# Patient Record
Sex: MALE | Race: WHITE | NOT HISPANIC OR LATINO | Employment: OTHER | URBAN - METROPOLITAN AREA
[De-identification: names, ages, dates, MRNs, and addresses within clinical notes are randomized per-mention and may not be internally consistent; named-entity substitution may affect disease eponyms.]

---

## 2017-08-04 LAB — INR PPP: 3.3

## 2017-08-07 ENCOUNTER — ANTI-COAG VISIT (OUTPATIENT)
Dept: CARDIOLOGY | Facility: CLINIC | Age: 67
End: 2017-08-07

## 2017-08-07 DIAGNOSIS — I34.9 NONRHEUMATIC MITRAL VALVE DISORDER: ICD-10-CM

## 2017-08-07 DIAGNOSIS — Z95.2 H/O MITRAL VALVE REPLACEMENT WITH MECHANICAL VALVE: ICD-10-CM

## 2017-08-07 DIAGNOSIS — Z79.01 ANTICOAGULATED ON COUMADIN: ICD-10-CM

## 2017-08-14 LAB — INR PPP: 2.7

## 2017-08-15 ENCOUNTER — ANTI-COAG VISIT (OUTPATIENT)
Dept: CARDIOLOGY | Facility: CLINIC | Age: 67
End: 2017-08-15

## 2017-08-15 DIAGNOSIS — Z95.2 H/O MITRAL VALVE REPLACEMENT WITH MECHANICAL VALVE: ICD-10-CM

## 2017-08-15 DIAGNOSIS — I34.9 NONRHEUMATIC MITRAL VALVE DISORDER: ICD-10-CM

## 2017-08-15 DIAGNOSIS — Z79.01 ANTICOAGULATED ON COUMADIN: ICD-10-CM

## 2017-09-26 ENCOUNTER — ANTI-COAG VISIT (OUTPATIENT)
Dept: CARDIOLOGY | Facility: CLINIC | Age: 67
End: 2017-09-26

## 2017-09-26 DIAGNOSIS — Z95.2 H/O MITRAL VALVE REPLACEMENT WITH MECHANICAL VALVE: ICD-10-CM

## 2017-09-26 DIAGNOSIS — Z79.01 ANTICOAGULATED ON COUMADIN: ICD-10-CM

## 2017-09-26 DIAGNOSIS — I34.9 NONRHEUMATIC MITRAL VALVE DISORDER: ICD-10-CM

## 2017-09-26 LAB — INR PPP: 3 (ref 2.5–3)

## 2017-09-26 NOTE — PROGRESS NOTES
Instructed on dosing instructions, recheck date, & we will call on if OOR.  Patient verbalizes understanding.

## 2017-10-09 LAB — INR PPP: 2 (ref 2.5–3)

## 2017-10-10 ENCOUNTER — ANTI-COAG VISIT (OUTPATIENT)
Dept: CARDIOLOGY | Facility: CLINIC | Age: 67
End: 2017-10-10

## 2017-10-10 DIAGNOSIS — Z79.01 ANTICOAGULATED ON COUMADIN: ICD-10-CM

## 2017-10-10 DIAGNOSIS — Z95.2 H/O MITRAL VALVE REPLACEMENT WITH MECHANICAL VALVE: ICD-10-CM

## 2017-10-10 DIAGNOSIS — I34.9 NONRHEUMATIC MITRAL VALVE DISORDER: ICD-10-CM

## 2017-10-16 LAB — INR PPP: 3 (ref 2.5–3)

## 2017-10-17 ENCOUNTER — ANTI-COAG VISIT (OUTPATIENT)
Dept: CARDIOLOGY | Facility: CLINIC | Age: 67
End: 2017-10-17

## 2017-10-17 DIAGNOSIS — I34.9 NONRHEUMATIC MITRAL VALVE DISORDER: ICD-10-CM

## 2017-10-17 DIAGNOSIS — Z79.01 ANTICOAGULATED ON COUMADIN: ICD-10-CM

## 2017-10-17 DIAGNOSIS — Z95.2 H/O MITRAL VALVE REPLACEMENT WITH MECHANICAL VALVE: ICD-10-CM

## 2017-10-31 ENCOUNTER — ANTI-COAG VISIT (OUTPATIENT)
Dept: CARDIOLOGY | Facility: CLINIC | Age: 67
End: 2017-10-31

## 2017-10-31 DIAGNOSIS — Z79.01 ANTICOAGULATED ON COUMADIN: ICD-10-CM

## 2017-10-31 DIAGNOSIS — I34.9 NONRHEUMATIC MITRAL VALVE DISORDER: ICD-10-CM

## 2017-10-31 DIAGNOSIS — Z95.2 H/O MITRAL VALVE REPLACEMENT WITH MECHANICAL VALVE: ICD-10-CM

## 2017-10-31 LAB — INR PPP: 2.9 (ref 2.5–3)

## 2017-11-28 ENCOUNTER — ANTI-COAG VISIT (OUTPATIENT)
Dept: CARDIOLOGY | Facility: CLINIC | Age: 67
End: 2017-11-28

## 2017-11-28 DIAGNOSIS — I34.9 NONRHEUMATIC MITRAL VALVE DISORDER: ICD-10-CM

## 2017-11-28 DIAGNOSIS — Z79.01 ANTICOAGULATED ON COUMADIN: ICD-10-CM

## 2017-11-28 DIAGNOSIS — Z95.2 H/O MITRAL VALVE REPLACEMENT WITH MECHANICAL VALVE: ICD-10-CM

## 2017-11-28 LAB — INR PPP: 3.2 (ref 2.5–3)

## 2018-01-09 ENCOUNTER — ANTI-COAG VISIT (OUTPATIENT)
Dept: CARDIOLOGY | Facility: CLINIC | Age: 68
End: 2018-01-09

## 2018-01-09 DIAGNOSIS — I34.9 NONRHEUMATIC MITRAL VALVE DISORDER: ICD-10-CM

## 2018-01-09 DIAGNOSIS — Z79.01 ANTICOAGULATED ON COUMADIN: ICD-10-CM

## 2018-01-09 DIAGNOSIS — Z95.2 H/O MITRAL VALVE REPLACEMENT WITH MECHANICAL VALVE: ICD-10-CM

## 2018-01-09 LAB — INR PPP: 2.7 (ref 2.5–3)

## 2018-01-30 ENCOUNTER — PATIENT MESSAGE (OUTPATIENT)
Dept: CARDIOLOGY | Facility: CLINIC | Age: 68
End: 2018-01-30

## 2018-02-14 ENCOUNTER — ANTI-COAG VISIT (OUTPATIENT)
Dept: CARDIOLOGY | Facility: CLINIC | Age: 68
End: 2018-02-14

## 2018-02-14 DIAGNOSIS — I34.9 NONRHEUMATIC MITRAL VALVE DISORDER: ICD-10-CM

## 2018-02-14 DIAGNOSIS — Z79.01 ANTICOAGULATED ON COUMADIN: ICD-10-CM

## 2018-02-14 DIAGNOSIS — Z95.2 H/O MITRAL VALVE REPLACEMENT WITH MECHANICAL VALVE: ICD-10-CM

## 2018-02-14 LAB — INR PPP: 2.1 (ref 2.5–3)

## 2018-02-28 ENCOUNTER — ANTI-COAG VISIT (OUTPATIENT)
Dept: CARDIOLOGY | Facility: CLINIC | Age: 68
End: 2018-02-28

## 2018-02-28 DIAGNOSIS — Z79.01 ANTICOAGULATED ON COUMADIN: ICD-10-CM

## 2018-02-28 DIAGNOSIS — Z95.2 H/O MITRAL VALVE REPLACEMENT WITH MECHANICAL VALVE: ICD-10-CM

## 2018-02-28 DIAGNOSIS — I34.9 NONRHEUMATIC MITRAL VALVE DISORDER: ICD-10-CM

## 2018-02-28 LAB — INR PPP: 2.8 (ref 2.5–3)

## 2018-03-28 ENCOUNTER — ANTI-COAG VISIT (OUTPATIENT)
Dept: CARDIOLOGY | Facility: CLINIC | Age: 68
End: 2018-03-28

## 2018-03-28 DIAGNOSIS — Z95.2 H/O MITRAL VALVE REPLACEMENT WITH MECHANICAL VALVE: ICD-10-CM

## 2018-03-28 DIAGNOSIS — Z79.01 ANTICOAGULATED ON COUMADIN: ICD-10-CM

## 2018-03-28 DIAGNOSIS — I34.9 NONRHEUMATIC MITRAL VALVE DISORDER: ICD-10-CM

## 2018-03-28 LAB — INR PPP: 3.4 (ref 2.5–3)

## 2018-04-25 ENCOUNTER — ANTI-COAG VISIT (OUTPATIENT)
Dept: CARDIOLOGY | Facility: CLINIC | Age: 68
End: 2018-04-25

## 2018-04-25 DIAGNOSIS — I34.9 NONRHEUMATIC MITRAL VALVE DISORDER: ICD-10-CM

## 2018-04-25 DIAGNOSIS — Z79.01 ANTICOAGULATED ON COUMADIN: ICD-10-CM

## 2018-04-25 DIAGNOSIS — Z95.2 H/O MITRAL VALVE REPLACEMENT WITH MECHANICAL VALVE: ICD-10-CM

## 2018-04-25 LAB — INR PPP: 3.3 (ref 2.5–3)

## 2018-05-09 ENCOUNTER — ANTI-COAG VISIT (OUTPATIENT)
Dept: CARDIOLOGY | Facility: CLINIC | Age: 68
End: 2018-05-09

## 2018-05-09 DIAGNOSIS — Z79.01 ANTICOAGULATED ON COUMADIN: ICD-10-CM

## 2018-05-09 DIAGNOSIS — Z95.2 H/O MITRAL VALVE REPLACEMENT WITH MECHANICAL VALVE: ICD-10-CM

## 2018-05-09 DIAGNOSIS — I34.9 NONRHEUMATIC MITRAL VALVE DISORDER: ICD-10-CM

## 2018-05-09 LAB — INR PPP: 2.6 (ref 2.5–3)

## 2018-05-23 ENCOUNTER — ANTI-COAG VISIT (OUTPATIENT)
Dept: CARDIOLOGY | Facility: CLINIC | Age: 68
End: 2018-05-23

## 2018-05-23 DIAGNOSIS — Z79.01 ANTICOAGULATED ON COUMADIN: ICD-10-CM

## 2018-05-23 DIAGNOSIS — Z95.2 H/O MITRAL VALVE REPLACEMENT WITH MECHANICAL VALVE: ICD-10-CM

## 2018-05-23 DIAGNOSIS — I34.9 NONRHEUMATIC MITRAL VALVE DISORDER: ICD-10-CM

## 2018-05-23 LAB — INR PPP: 3.3 (ref 2–3)

## 2018-05-23 NOTE — PROGRESS NOTES
Spoke to pt. INR slightly elevated for pt today.  Patient confirms dose and compliance. Stated he has been out of town & diet has been decreased in Vitamin K lately.  No other changes or problems reported.  Decrease dose 5/23/18 & then resume.  Recheck INR in next week. Pt able to restate instructions.

## 2018-05-30 ENCOUNTER — ANTI-COAG VISIT (OUTPATIENT)
Dept: CARDIOLOGY | Facility: CLINIC | Age: 68
End: 2018-05-30

## 2018-05-30 DIAGNOSIS — I34.9 NONRHEUMATIC MITRAL VALVE DISORDER: ICD-10-CM

## 2018-05-30 DIAGNOSIS — Z95.2 H/O MITRAL VALVE REPLACEMENT WITH MECHANICAL VALVE: ICD-10-CM

## 2018-05-30 DIAGNOSIS — Z79.01 ANTICOAGULATED ON COUMADIN: ICD-10-CM

## 2018-05-30 LAB — INR PPP: 2.9 (ref 2.5–3)

## 2018-05-30 NOTE — PROGRESS NOTES
INR received via coag per pt.   INR in range.  Will maintain dose and recheck INR in 2 weeks. Pt asked to be called only if out of range.

## 2018-06-19 NOTE — PROGRESS NOTES
Spoke with pt to remind him to test he reports he tested last week and will call back with results and what day he tested when he gets home

## 2018-08-22 ENCOUNTER — ANTI-COAG VISIT (OUTPATIENT)
Dept: CARDIOLOGY | Facility: CLINIC | Age: 68
End: 2018-08-22

## 2018-08-22 DIAGNOSIS — Z79.01 ANTICOAGULATED ON COUMADIN: ICD-10-CM

## 2018-08-22 DIAGNOSIS — I34.9 NONRHEUMATIC MITRAL VALVE DISORDER: ICD-10-CM

## 2018-08-22 DIAGNOSIS — Z95.2 H/O MITRAL VALVE REPLACEMENT WITH MECHANICAL VALVE: ICD-10-CM

## 2018-08-22 LAB — INR PPP: 2.4 (ref 2.5–3)

## 2019-09-24 ENCOUNTER — PATIENT MESSAGE (OUTPATIENT)
Dept: CARDIOLOGY | Facility: CLINIC | Age: 69
End: 2019-09-24

## 2019-11-14 PROBLEM — I25.5 ISCHEMIC CARDIOMYOPATHY: Status: ACTIVE | Noted: 2019-11-14

## 2019-11-14 PROBLEM — Z95.5 S/P CORONARY ARTERY STENT PLACEMENT: Status: ACTIVE | Noted: 2019-11-14

## 2019-11-25 PROBLEM — M75.122 NONTRAUMATIC COMPLETE TEAR OF LEFT ROTATOR CUFF: Status: ACTIVE | Noted: 2019-11-25

## 2019-11-25 PROBLEM — M75.121 COMPLETE TEAR OF RIGHT ROTATOR CUFF: Status: ACTIVE | Noted: 2019-11-25

## 2019-12-03 PROBLEM — R93.1 DECREASED CARDIAC EJECTION FRACTION: Status: ACTIVE | Noted: 2019-12-03

## 2020-01-30 PROBLEM — Z95.0 BIVENTRICULAR CARDIAC PACEMAKER IN SITU: Status: ACTIVE | Noted: 2020-01-30

## 2020-10-30 PROBLEM — S68.119A AMPUTATION OF FINGER TIP: Status: ACTIVE | Noted: 2020-10-30

## 2021-01-05 ENCOUNTER — TELEPHONE (OUTPATIENT)
Dept: FAMILY MEDICINE | Facility: CLINIC | Age: 71
End: 2021-01-05

## 2021-01-05 ENCOUNTER — IMMUNIZATION (OUTPATIENT)
Dept: FAMILY MEDICINE | Facility: CLINIC | Age: 71
End: 2021-01-05
Payer: MEDICARE

## 2021-01-05 DIAGNOSIS — Z23 NEED FOR VACCINATION: ICD-10-CM

## 2021-01-05 PROCEDURE — 91300 COVID-19, MRNA, LNP-S, PF, 30 MCG/0.3 ML DOSE VACCINE: CPT | Mod: PBBFAC | Performed by: FAMILY MEDICINE

## 2021-01-26 ENCOUNTER — IMMUNIZATION (OUTPATIENT)
Dept: FAMILY MEDICINE | Facility: CLINIC | Age: 71
End: 2021-01-26
Payer: MEDICARE

## 2021-01-26 DIAGNOSIS — Z23 NEED FOR VACCINATION: Primary | ICD-10-CM

## 2021-01-26 PROCEDURE — 0002A COVID-19, MRNA, LNP-S, PF, 30 MCG/0.3 ML DOSE VACCINE: CPT | Mod: PBBFAC | Performed by: FAMILY MEDICINE

## 2021-01-26 PROCEDURE — 91300 COVID-19, MRNA, LNP-S, PF, 30 MCG/0.3 ML DOSE VACCINE: CPT | Mod: PBBFAC | Performed by: FAMILY MEDICINE

## 2025-05-02 ENCOUNTER — HOSPITAL ENCOUNTER (INPATIENT)
Facility: HOSPITAL | Age: 75
LOS: 1 days | Discharge: HOME | DRG: 316 | End: 2025-05-05
Attending: EMERGENCY MEDICINE | Admitting: STUDENT IN AN ORGANIZED HEALTH CARE EDUCATION/TRAINING PROGRAM
Payer: MEDICARE

## 2025-05-02 DIAGNOSIS — I72.4 PSEUDOANEURYSM OF FEMORAL ARTERY FOLLOWING PROCEDURE: ICD-10-CM

## 2025-05-02 DIAGNOSIS — I25.10 ARTERIOSCLEROSIS OF CORONARY ARTERY: ICD-10-CM

## 2025-05-02 DIAGNOSIS — R07.9 CHEST PAIN: ICD-10-CM

## 2025-05-02 DIAGNOSIS — I72.4 PSEUDOANEURYSM OF FEMORAL ARTERY: Primary | ICD-10-CM

## 2025-05-02 DIAGNOSIS — T81.718A PSEUDOANEURYSM OF FEMORAL ARTERY FOLLOWING PROCEDURE: ICD-10-CM

## 2025-05-02 PROCEDURE — 63600175 PHARM REV CODE 636 W HCPCS: Performed by: EMERGENCY MEDICINE

## 2025-05-02 PROCEDURE — 96374 THER/PROPH/DIAG INJ IV PUSH: CPT

## 2025-05-02 PROCEDURE — 99285 EMERGENCY DEPT VISIT HI MDM: CPT | Mod: 25

## 2025-05-02 PROCEDURE — 99223 1ST HOSP IP/OBS HIGH 75: CPT | Mod: ,,, | Performed by: SURGERY

## 2025-05-02 PROCEDURE — 94761 N-INVAS EAR/PLS OXIMETRY MLT: CPT

## 2025-05-02 PROCEDURE — G0378 HOSPITAL OBSERVATION PER HR: HCPCS

## 2025-05-02 RX ORDER — TALC
6 POWDER (GRAM) TOPICAL NIGHTLY PRN
Status: DISCONTINUED | OUTPATIENT
Start: 2025-05-03 | End: 2025-05-05 | Stop reason: HOSPADM

## 2025-05-02 RX ORDER — LORAZEPAM 2 MG/ML
1 INJECTION INTRAMUSCULAR
Status: COMPLETED | OUTPATIENT
Start: 2025-05-02 | End: 2025-05-02

## 2025-05-02 RX ORDER — HYDROCODONE BITARTRATE AND ACETAMINOPHEN 500; 5 MG/1; MG/1
TABLET ORAL
Status: DISCONTINUED | OUTPATIENT
Start: 2025-05-02 | End: 2025-05-05 | Stop reason: HOSPADM

## 2025-05-02 RX ORDER — SODIUM CHLORIDE 0.9 % (FLUSH) 0.9 %
10 SYRINGE (ML) INJECTION
Status: DISCONTINUED | OUTPATIENT
Start: 2025-05-03 | End: 2025-05-05 | Stop reason: HOSPADM

## 2025-05-02 RX ADMIN — LORAZEPAM 1 MG: 2 INJECTION INTRAMUSCULAR; INTRAVENOUS at 10:05

## 2025-05-03 LAB
ABSOLUTE EOSINOPHIL (OHS): 0.21 K/UL
ABSOLUTE MONOCYTE (OHS): 0.72 K/UL (ref 0.3–1)
ABSOLUTE NEUTROPHIL COUNT (OHS): 5.84 K/UL (ref 1.8–7.7)
ANION GAP (OHS): 9 MMOL/L (ref 8–16)
BASOPHILS # BLD AUTO: 0.07 K/UL
BASOPHILS NFR BLD AUTO: 0.9 %
BUN SERPL-MCNC: 22 MG/DL (ref 8–23)
CALCIUM SERPL-MCNC: 8.8 MG/DL (ref 8.7–10.5)
CHLORIDE SERPL-SCNC: 111 MMOL/L (ref 95–110)
CO2 SERPL-SCNC: 24 MMOL/L (ref 23–29)
CREAT SERPL-MCNC: 1.1 MG/DL (ref 0.5–1.4)
ERYTHROCYTE [DISTWIDTH] IN BLOOD BY AUTOMATED COUNT: 15.4 % (ref 11.5–14.5)
GFR SERPLBLD CREATININE-BSD FMLA CKD-EPI: >60 ML/MIN/1.73/M2
GLUCOSE SERPL-MCNC: 125 MG/DL (ref 70–110)
HCT VFR BLD AUTO: 32.2 % (ref 40–54)
HGB BLD-MCNC: 10.2 GM/DL (ref 14–18)
IMM GRANULOCYTES # BLD AUTO: 0.05 K/UL (ref 0–0.04)
IMM GRANULOCYTES NFR BLD AUTO: 0.7 % (ref 0–0.5)
INR PPP: 2.3 (ref 0.8–1.2)
LYMPHOCYTES # BLD AUTO: 0.48 K/UL (ref 1–4.8)
MCH RBC QN AUTO: 30.2 PG (ref 27–31)
MCHC RBC AUTO-ENTMCNC: 31.7 G/DL (ref 32–36)
MCV RBC AUTO: 95 FL (ref 82–98)
NUCLEATED RBC (/100WBC) (OHS): 0 /100 WBC
PLATELET # BLD AUTO: 198 K/UL (ref 150–450)
PMV BLD AUTO: 11.1 FL (ref 9.2–12.9)
POTASSIUM SERPL-SCNC: 3.9 MMOL/L (ref 3.5–5.1)
PROTHROMBIN TIME: 23.5 SECONDS (ref 9–12.5)
RBC # BLD AUTO: 3.38 M/UL (ref 4.6–6.2)
RELATIVE EOSINOPHIL (OHS): 2.8 %
RELATIVE LYMPHOCYTE (OHS): 6.5 % (ref 18–48)
RELATIVE MONOCYTE (OHS): 9.8 % (ref 4–15)
RELATIVE NEUTROPHIL (OHS): 79.3 % (ref 38–73)
SODIUM SERPL-SCNC: 144 MMOL/L (ref 136–145)
WBC # BLD AUTO: 7.37 K/UL (ref 3.9–12.7)

## 2025-05-03 PROCEDURE — 25000003 PHARM REV CODE 250: Performed by: STUDENT IN AN ORGANIZED HEALTH CARE EDUCATION/TRAINING PROGRAM

## 2025-05-03 PROCEDURE — 80048 BASIC METABOLIC PNL TOTAL CA: CPT

## 2025-05-03 PROCEDURE — 85610 PROTHROMBIN TIME: CPT

## 2025-05-03 PROCEDURE — 36415 COLL VENOUS BLD VENIPUNCTURE: CPT

## 2025-05-03 PROCEDURE — 25000003 PHARM REV CODE 250

## 2025-05-03 PROCEDURE — 25000003 PHARM REV CODE 250: Performed by: EMERGENCY MEDICINE

## 2025-05-03 PROCEDURE — 85025 COMPLETE CBC W/AUTO DIFF WBC: CPT

## 2025-05-03 PROCEDURE — G0378 HOSPITAL OBSERVATION PER HR: HCPCS

## 2025-05-03 RX ORDER — IBUPROFEN 200 MG
16 TABLET ORAL
Status: DISCONTINUED | OUTPATIENT
Start: 2025-05-03 | End: 2025-05-05 | Stop reason: HOSPADM

## 2025-05-03 RX ORDER — METHOCARBAMOL 500 MG/1
500 TABLET, FILM COATED ORAL 4 TIMES DAILY
Status: DISCONTINUED | OUTPATIENT
Start: 2025-05-03 | End: 2025-05-05 | Stop reason: HOSPADM

## 2025-05-03 RX ORDER — OXYCODONE HYDROCHLORIDE 5 MG/1
5 TABLET ORAL EVERY 6 HOURS PRN
Refills: 0 | Status: DISCONTINUED | OUTPATIENT
Start: 2025-05-03 | End: 2025-05-05 | Stop reason: HOSPADM

## 2025-05-03 RX ORDER — GLUCAGON 1 MG
1 KIT INJECTION
Status: DISCONTINUED | OUTPATIENT
Start: 2025-05-03 | End: 2025-05-05 | Stop reason: HOSPADM

## 2025-05-03 RX ORDER — NALOXONE HCL 0.4 MG/ML
0.02 VIAL (ML) INJECTION
Status: DISCONTINUED | OUTPATIENT
Start: 2025-05-03 | End: 2025-05-05 | Stop reason: HOSPADM

## 2025-05-03 RX ORDER — SODIUM CHLORIDE 0.9 % (FLUSH) 0.9 %
10 SYRINGE (ML) INJECTION EVERY 12 HOURS PRN
Status: DISCONTINUED | OUTPATIENT
Start: 2025-05-03 | End: 2025-05-05 | Stop reason: HOSPADM

## 2025-05-03 RX ORDER — LEVOTHYROXINE SODIUM 75 UG/1
75 TABLET ORAL
Status: DISCONTINUED | OUTPATIENT
Start: 2025-05-03 | End: 2025-05-05 | Stop reason: HOSPADM

## 2025-05-03 RX ORDER — ACETAMINOPHEN 325 MG/1
650 TABLET ORAL EVERY 6 HOURS
Status: DISCONTINUED | OUTPATIENT
Start: 2025-05-03 | End: 2025-05-05 | Stop reason: HOSPADM

## 2025-05-03 RX ORDER — AMIODARONE HYDROCHLORIDE 200 MG/1
200 TABLET ORAL DAILY
Status: DISCONTINUED | OUTPATIENT
Start: 2025-05-03 | End: 2025-05-05 | Stop reason: HOSPADM

## 2025-05-03 RX ORDER — ATORVASTATIN CALCIUM 40 MG/1
80 TABLET, FILM COATED ORAL DAILY
Status: DISCONTINUED | OUTPATIENT
Start: 2025-05-03 | End: 2025-05-05 | Stop reason: HOSPADM

## 2025-05-03 RX ORDER — METOPROLOL SUCCINATE 25 MG/1
25 TABLET, EXTENDED RELEASE ORAL DAILY
Status: DISCONTINUED | OUTPATIENT
Start: 2025-05-03 | End: 2025-05-05 | Stop reason: HOSPADM

## 2025-05-03 RX ORDER — IBUPROFEN 200 MG
24 TABLET ORAL
Status: DISCONTINUED | OUTPATIENT
Start: 2025-05-03 | End: 2025-05-05 | Stop reason: HOSPADM

## 2025-05-03 RX ADMIN — ACETAMINOPHEN 650 MG: 325 TABLET ORAL at 11:05

## 2025-05-03 RX ADMIN — METOPROLOL SUCCINATE 25 MG: 25 TABLET, EXTENDED RELEASE ORAL at 08:05

## 2025-05-03 RX ADMIN — AMIODARONE HYDROCHLORIDE 200 MG: 200 TABLET ORAL at 08:05

## 2025-05-03 RX ADMIN — METHOCARBAMOL 500 MG: 500 TABLET ORAL at 01:05

## 2025-05-03 RX ADMIN — METHOCARBAMOL 500 MG: 500 TABLET ORAL at 06:05

## 2025-05-03 RX ADMIN — ACETAMINOPHEN 650 MG: 325 TABLET ORAL at 06:05

## 2025-05-03 RX ADMIN — METHOCARBAMOL 500 MG: 500 TABLET ORAL at 03:05

## 2025-05-03 RX ADMIN — Medication 6 MG: at 01:05

## 2025-05-03 RX ADMIN — METHOCARBAMOL 500 MG: 500 TABLET ORAL at 08:05

## 2025-05-03 RX ADMIN — LEVOTHYROXINE SODIUM 75 MCG: 75 TABLET ORAL at 06:05

## 2025-05-03 RX ADMIN — ACETAMINOPHEN 650 MG: 325 TABLET ORAL at 01:05

## 2025-05-03 RX ADMIN — OXYCODONE 5 MG: 5 TABLET ORAL at 04:05

## 2025-05-03 RX ADMIN — OXYCODONE 5 MG: 5 TABLET ORAL at 10:05

## 2025-05-03 RX ADMIN — ATORVASTATIN CALCIUM 80 MG: 40 TABLET, FILM COATED ORAL at 08:05

## 2025-05-03 NOTE — ASSESSMENT & PLAN NOTE
On coumadin.    -hold coumadin due to pseudoaneurysm  -daily PT/INR  -if INR not less than 1.5 morning of 5/3, order FFP

## 2025-05-03 NOTE — HPI
Frederick Bustos is a 74-year-old male with hypertension, hyperlipidemia, mechanical mitral valve on Coumadin, history of CABG x3 in 2008, coronary stent in 2019, history of laryngeal cancer status post adjuvant radiation, pacemaker, CHF, aortic stenosis, AFib who was transferred from [facility] with a left common femoral artery pseudoaneurysm. He underwent LHC, abdominal angioram, and PTA-S of R MOUNIKA on 4/28. This was complicated by a groin access complication on the right that was treated in the right CFA from left groin access. His currrent groin access complication on the left. Denies fever, chills, nausea, emesis, abdominal pain, leg swelling, weakness, and sensory changes. He was seen by Vascular Surgery who plans to inject thrombin directly into vessel at bedside with a post-procedural ultrasound. Anticipate rapid discharge after this. ED provider spoke to Cardiology and noted that because he has a mechanical heart valve they recommend just holding the Coumadin and rechecking tomorrow morning.    The patient was admitted to Hospital Medicine for further workup and management.

## 2025-05-03 NOTE — SUBJECTIVE & OBJECTIVE
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    Cardiac pacemaker 06/15/2016    PPM Gen change Eluna 8 DRT (sn [id number])    CHF (congestive heart failure) 12/03/2019    Class 2-3     Combined fat and carbohydrate induced hyperlipemia 1/19/2006    Coronary artery disease     stents 2;     Decreased cardiac ejection fraction 11/2019    Encounter for blood transfusion     Essential (primary) hypertension 8/30/2007    H/O: GI bleed     History of mitral valve replacement with mechanical valve 2008    Ischemic cardiomyopathy 11/2019    PAF (paroxysmal atrial fibrillation)     SSS (sick sinus syndrome)     Thyroid disease        Past Surgical History:   Procedure Laterality Date    ANGIOGRAM, ABDOMINAL AORTA  4/28/2025    Procedure: Abdominal angiogram;  Surgeon: Dung Copeland MD;  Location: [facility];  Service: Cardiology;;    ANGIOGRAM, CORONARY, WITH LEFT HEART CATHETERIZATION  4/28/2025    Procedure: Left Heart Cath;  Surgeon: Dung Copeland MD;  Location: [facility];  Service: Cardiology;;    CARDIAC PACEMAKER PLACEMENT      CARDIAC SURGERY      CARDIAC VALVE REPLACEMENT  03/20/2008    COLONOSCOPY N/A 09/06/2023    Procedure: COLONOSCOPY;  Surgeon: Dung Ruiz MD;  Location: [facility];  Service: Endoscopy;  Laterality: N/A;    CORONARY ANGIOGRAPHY N/A 07/12/2019    Procedure: ANGIOGRAM, CORONARY ARTERY;  Surgeon: Sanam Gibbs MD;  Location: [facility];  Service: Cardiology;  Laterality: N/A;    CORONARY ANGIOGRAPHY N/A 08/22/2019    Procedure: ANGIOGRAM, CORONARY ARTERY;  Surgeon: Sanam Gibbs MD;  Location: [facility];  Service: Cardiology;  Laterality: N/A;    CORONARY ARTERY BYPASS GRAFT  03/20/2008    and valve replacement    CORONARY STENT PLACEMENT Left 08/22/2019    Procedure: INSERTION, STENT, CORONARY ARTERY;  Surgeon: Sanam Gibbs MD;  Location: [facility];  Service: Cardiology;  Laterality: Left;    EYE SURGERY  03/24/2021     lens implant,J&J, right eye    INSERTION OF BIVENTRICULAR IMPLANTABLE CARDIOVERTER-DEFIBRILLATOR (ICD) N/A 01/09/2020    Procedure: INSERTION, ICD, BIVENTRICULAR- BIOTRONIK;  Surgeon: Sanam Gibbs MD;  Location: [facility];  Service: Cardiology;  Laterality: N/A;    INSERTION OF PACEMAKER  06/15/2016    INSERTION, STENT, BARE METAL, ARTERY, PERIPHERAL  4/28/2025    Procedure: PTA / Stent Rt. Common Iliac;  Surgeon: Dung Copeland MD;  Location: [facility];  Service: Cardiology;;    LEFT HEART CATHETERIZATION Left 07/12/2019    Procedure: Left heart cath;  Surgeon: Sanam Gibbs MD;  Location: [facility];  Service: Cardiology;  Laterality: Left;    LIGATE GI BLEEDER      MITRAL VALVE REPLACEMENT  03/20/2008    with Cabg X 3    WRIST SURGERY Right        Review of patient's allergies indicates:  No Known Allergies    Current Facility-Administered Medications on File Prior to Encounter   Medication    [COMPLETED] iohexoL (OMNIPAQUE 350) injection 80 mL    lactated ringers infusion    [COMPLETED] morphine injection 2 mg    sodium chloride 0.9% flush 10 mL     Current Outpatient Medications on File Prior to Encounter   Medication Sig    acetaminophen (TYLENOL) 500 mg Cap Take 1,000-1,500 mg by mouth 3 (three) times daily as needed.     amiodarone (PACERONE) 200 MG Tab Take 1 tablet (200 mg total) by mouth once daily.    amLODIPine (NORVASC) 5 MG tablet TAKE 1 TABLET EVERY DAY (Patient taking differently: Take 5 mg by mouth once daily.)    atorvastatin (LIPITOR) 80 MG tablet TAKE 1 TABLET EVERY DAY (Patient taking differently: Take 80 mg by mouth once daily.)    clopidogreL (PLAVIX) 75 mg tablet TAKE 1 TABLET ONE TIME DAILY (Patient taking differently: Take 75 mg by mouth nightly.)    enoxaparin (LOVENOX) 100 mg/mL Syrg Inject 1 mL (100 mg total) into the skin every 12 (twelve) hours. #10= 5 day supply    ezetimibe (ZETIA) 10 mg tablet TAKE 1 TABLET EVERY DAY (Patient taking differently: Take 10 mg by mouth once daily.)     fenofibrate 160 MG Tab TAKE 1 TABLET ONE TIME DAILY (Patient taking differently: Take 160 mg by mouth once daily.)    HYDROcodone-acetaminophen (NORCO) 5-325 mg per tablet Take 1 tablet by mouth every 8 (eight) hours as needed for Pain.    levothyroxine (SYNTHROID) 75 MCG tablet Take 1 tablet (75 mcg total) by mouth before breakfast.    lisinopriL (PRINIVIL,ZESTRIL) 40 MG tablet TAKE 1 TABLET EVERY DAY (Patient taking differently: Take 40 mg by mouth once daily.)    metoprolol succinate (TOPROL-XL) 25 MG 24 hr tablet TAKE 1 TABLET EVERY DAY (Patient taking differently: Take 25 mg by mouth once daily.)    multivitamin capsule Take 1 capsule by mouth once daily.    mv-mn/iron/folic acid/herb 190 (VITAMIN D3 COMPLETE ORAL) Take 1 capsule by mouth nightly.    TURMERIC ORAL Take 1 capsule by mouth Daily.    warfarin (COUMADIN) 5 MG tablet TAKE AS DIRECTED (NEEDS APPOINTMENT BEFORE ANY FURTHER REFILLS) (Patient taking differently: Take 5 mg by mouth nightly. TAKE AS DIRECTED (NEEDS APPOINTMENT BEFORE ANY FURTHER REFILLS))     Family History       Problem Relation (Age of Onset)    Heart disease Father    No Known Problems Mother          Tobacco Use    Smoking status: Former     Current packs/day: 0.00     Average packs/day: 1.5 packs/day for 40.0 years (60.0 ttl pk-yrs)     Types: Cigarettes     Start date: 1968     Quit date: 2008     Years since quittin.1    Smokeless tobacco: Never   Substance and Sexual Activity    Alcohol use: Yes     Alcohol/week: 4.0 standard drinks of alcohol     Types: 4 Glasses of wine per week     Comment: daily    Drug use: No    Sexual activity: Never     Review of Systems   Constitutional:  Positive for fatigue. Negative for chills and fever.   Eyes:  Negative for visual disturbance.   Respiratory:  Negative for cough and shortness of breath.    Cardiovascular:  Negative for chest pain and leg swelling.   Gastrointestinal:  Negative for abdominal pain, diarrhea, nausea and  vomiting.   Genitourinary:  Positive for scrotal swelling. Negative for flank pain and hematuria.   Musculoskeletal:  Positive for back pain and myalgias.   Skin:  Positive for color change.   Psychiatric/Behavioral:  Negative for confusion and decreased concentration.      Objective:     Vital Signs (Most Recent):  Temp: 98.2 °F (36.8 °C) (05/03/25 0033)  Pulse: 60 (05/03/25 0225)  Resp: 16 (05/03/25 0033)  BP: (!) 146/65 (05/03/25 0033)  SpO2: 97 % (05/03/25 0033) Vital Signs (24h Range):  Temp:  [98 °F (36.7 °C)-98.7 °F (37.1 °C)] 98.2 °F (36.8 °C)  Pulse:  [] 60  Resp:  [16-18] 16  SpO2:  [90 %-97 %] 97 %  BP: (116-159)/(59-76) 146/65     Weight: 97.1 kg (214 lb 1.1 oz)  Body mass index is 29.03 kg/m².     Physical Exam  Vitals reviewed.   Constitutional:       General: He is not in acute distress.     Appearance: He is not ill-appearing.   HENT:      Head: Normocephalic and atraumatic.      Mouth/Throat:      Mouth: Mucous membranes are moist.      Pharynx: Oropharynx is clear.   Eyes:      General: No scleral icterus.     Conjunctiva/sclera: Conjunctivae normal.   Cardiovascular:      Rate and Rhythm: Normal rate and regular rhythm.      Pulses: Normal pulses.      Comments: Ejection click  Abdominal:      General: There is no distension.      Palpations: Abdomen is soft.      Tenderness: There is no abdominal tenderness. There is no guarding.   Genitourinary:     Comments: Swelling and bruising to scrotum  Musculoskeletal:      Right lower leg: No edema.      Left lower leg: No edema.      Comments: Extensive bilateral bruising to thighs   Skin:     General: Skin is warm and dry.      Findings: Bruising present.   Neurological:      General: No focal deficit present.      Mental Status: He is alert. Mental status is at baseline.   Psychiatric:         Mood and Affect: Mood normal.         Behavior: Behavior normal.                Significant Labs: All pertinent labs within the past 24 hours have been  reviewed.  CBC:   Recent Labs   Lab 05/02/25  1353   WBC 7.24   HGB 10.1*   HCT 30.3*        CMP:   Recent Labs   Lab 05/02/25  1353      K 4.0   *   CO2 26      BUN 29*   CREATININE 1.28   CALCIUM 8.7   PROT 6.5   ALBUMIN 3.5   BILITOT 0.9   ALKPHOS 50   AST 28   ALT 13   ANIONGAP 4*     Coagulation:   Recent Labs   Lab 05/02/25  1353   INR 2.3   APTT 56.1*       Significant Imaging: I have reviewed all pertinent imaging results/findings within the past 24 hours.

## 2025-05-03 NOTE — SUBJECTIVE & OBJECTIVE
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    Cardiac pacemaker 06/15/2016    PPM Gen change Eluna 8 DRT (sn [id number])    CHF (congestive heart failure) 12/03/2019    Class 2-3     Combined fat and carbohydrate induced hyperlipemia 1/19/2006    Coronary artery disease     stents 2;     Decreased cardiac ejection fraction 11/2019    Encounter for blood transfusion     Essential (primary) hypertension 8/30/2007    H/O: GI bleed     History of mitral valve replacement with mechanical valve 2008    Ischemic cardiomyopathy 11/2019    PAF (paroxysmal atrial fibrillation)     SSS (sick sinus syndrome)     Thyroid disease      Past Surgical History:   Procedure Laterality Date    ANGIOGRAM, ABDOMINAL AORTA  4/28/2025    Procedure: Abdominal angiogram;  Surgeon: Dung Copeland MD;  Location: [facility];  Service: Cardiology;;    ANGIOGRAM, CORONARY, WITH LEFT HEART CATHETERIZATION  4/28/2025    Procedure: Left Heart Cath;  Surgeon: Dung Copeland MD;  Location: [facility];  Service: Cardiology;;    CARDIAC PACEMAKER PLACEMENT      CARDIAC SURGERY      CARDIAC VALVE REPLACEMENT  03/20/2008    COLONOSCOPY N/A 09/06/2023    Procedure: COLONOSCOPY;  Surgeon: Dung Ruiz MD;  Location: [facility];  Service: Endoscopy;  Laterality: N/A;    CORONARY ANGIOGRAPHY N/A 07/12/2019    Procedure: ANGIOGRAM, CORONARY ARTERY;  Surgeon: Sanam Gibbs MD;  Location: [facility];  Service: Cardiology;  Laterality: N/A;    CORONARY ANGIOGRAPHY N/A 08/22/2019    Procedure: ANGIOGRAM, CORONARY ARTERY;  Surgeon: Sanam Gibbs MD;  Location: [facility];  Service: Cardiology;  Laterality: N/A;    CORONARY ARTERY BYPASS GRAFT  03/20/2008    and valve replacement    CORONARY STENT PLACEMENT Left 08/22/2019    Procedure: INSERTION, STENT, CORONARY ARTERY;  Surgeon: Sanam  MD Bernie;  Location: [facility];  Service: Cardiology;  Laterality: Left;    EYE SURGERY  2021    lens implant,J&J, right eye    INSERTION OF BIVENTRICULAR IMPLANTABLE CARDIOVERTER-DEFIBRILLATOR (ICD) N/A 2020    Procedure: INSERTION, ICD, BIVENTRICULAR- BIOTRONIK;  Surgeon: Sanam Gibbs MD;  Location: [facility];  Service: Cardiology;  Laterality: N/A;    INSERTION OF PACEMAKER  06/15/2016    INSERTION, STENT, BARE METAL, ARTERY, PERIPHERAL  2025    Procedure: PTA / Stent Rt. Common Iliac;  Surgeon: Dung Copeland MD;  Location: [facility];  Service: Cardiology;;    LEFT HEART CATHETERIZATION Left 2019    Procedure: Left heart cath;  Surgeon: Sanam Gibbs MD;  Location: [facility];  Service: Cardiology;  Laterality: Left;    LIGATE GI BLEEDER      MITRAL VALVE REPLACEMENT  2008    with Cabg X 3    WRIST SURGERY Right      Family History       Problem Relation (Age of Onset)    Heart disease Father    No Known Problems Mother          Tobacco Use    Smoking status: Former     Current packs/day: 0.00     Average packs/day: 1.5 packs/day for 40.0 years (60.0 ttl pk-yrs)     Types: Cigarettes     Start date: 1968     Quit date: 2008     Years since quittin.1    Smokeless tobacco: Never   Substance and Sexual Activity    Alcohol use: Yes     Alcohol/week: 4.0 standard drinks of alcohol     Types: 4 Glasses of wine per week     Comment: daily    Drug use: No    Sexual activity: Never     Review of Systems   All other systems reviewed and are negative.    Objective:     Vital Signs (Most Recent):  Temp: 98.6 °F (37 °C) (25)  Pulse: 60 (25)  Resp: 17 (25)  BP: (!) 159/72 (25)  SpO2: 95 % (25) Vital Signs (24h Range):  Temp:  [98 °F (36.7 °C)-98.7 °F (37.1 °C)] 98.6 °F (37 °C)  Pulse:  [] 60  Resp:  [16-18] 17  SpO2:  [95 %-97 %] 95 %  BP: (116-159)/(61-76) 159/72     Weight: 97.5 kg (215 lb)  Body mass index is 29.16  kg/m².      Physical Exam  Constitutional:       General: He is not in acute distress.  HENT:      Mouth/Throat:      Mouth: Mucous membranes are moist.   Cardiovascular:      Rate and Rhythm: Normal rate.      Comments: Palpable femoral pulses, large L groin mass w/ significant ecchymosis  Palpable pedal pulses, 2+ B/L  No motor sensory deficits  Pulmonary:      Effort: Pulmonary effort is normal.   Neurological:      Mental Status: He is alert.      Sensory: No sensory deficit.      Motor: No weakness.          Significant Labs:  All pertinent labs from the last 24 hours have been reviewed.    Significant Diagnostics:  I have reviewed all pertinent imaging results/findings within the past 24 hours.       [1] (Not in a hospital admission)

## 2025-05-03 NOTE — ASSESSMENT & PLAN NOTE
L CFA PSA, post procedure at outside hospital.     Due to long neck, will try bedside thrombin injection once INR drifts (goal 1.6)  Please hold coumadin, NPO @ [facility]  Would be very wary to reverse INR in setting of mechanical valve but ultimately defer to primary team  Daily INR, bed rest - does not need to lie flat

## 2025-05-03 NOTE — CONSULTS
"[facility] - [facility] Dept  Vascular Surgery  Consult Note    Consults  Subjective:     Reason for consult: "Pseudoaneurysm"    History of Present Illness: This patient is a 74-year-old male transferred from outside hospital with left common femoral artery pseudoaneurysm.  Past medical history is significant for hypertension, hyperlipidemia, mechanical mitral valve on Coumadin, history of CABG x3 in 2008, coronary stent in 2019, history of laryngeal cancer status post adjuvant radiation, pacemaker, CHF, aortic stenosis, AFib.      On the 28th of April, underwent LHC, abdominal angioram, and PTA-S of R MOUNIKA which was complicated by groin access complication on the RIGHT that was treated with 8x39 Viabahn in the R CFA from L groin access. This is a groin access complication on the LEFT. Denies motorsensory changes or significant pain.     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    Cardiac pacemaker 06/15/2016    PPM Gen change Eluna 8 DRT (sn [id number])    CHF (congestive heart failure) 12/03/2019    Class 2-3     Combined fat and carbohydrate induced hyperlipemia 1/19/2006    Coronary artery disease     stents 2;     Decreased cardiac ejection fraction 11/2019    Encounter for blood transfusion     Essential (primary) hypertension 8/30/2007    H/O: GI bleed     History of mitral valve replacement with mechanical valve 2008    Ischemic cardiomyopathy 11/2019    PAF (paroxysmal atrial fibrillation)     SSS (sick sinus syndrome)     Thyroid disease      Past Surgical History:   Procedure Laterality Date    ANGIOGRAM, ABDOMINAL AORTA  4/28/2025    Procedure: Abdominal angiogram;  Surgeon: Dung Copeland MD;  Location: [facility] CATH;  Service: Cardiology;;    ANGIOGRAM, CORONARY, " WITH LEFT HEART CATHETERIZATION  4/28/2025    Procedure: Left Heart Cath;  Surgeon: Dung Copeland MD;  [facility];  Service: Cardiology;;    CARDIAC PACEMAKER PLACEMENT      CARDIAC SURGERY      CARDIAC VALVE REPLACEMENT  03/20/2008    COLONOSCOPY N/A 09/06/2023    Procedure: COLONOSCOPY;  Surgeon: Dung Ruiz MD;  Location: [facility];  Service: Endoscopy;  Laterality: N/A;    CORONARY ANGIOGRAPHY N/A 07/12/2019    Procedure: ANGIOGRAM, CORONARY ARTERY;  Surgeon: Sanam Gibbs MD;  [facility];  Service: Cardiology;  Laterality: N/A;    CORONARY ANGIOGRAPHY N/A 08/22/2019    Procedure: ANGIOGRAM, CORONARY ARTERY;  Surgeon: Sanam Gibbs MD;  [facility];  Service: Cardiology;  Laterality: N/A;    CORONARY ARTERY BYPASS GRAFT  03/20/2008    and valve replacement    CORONARY STENT PLACEMENT Left 08/22/2019    Procedure: INSERTION, STENT, CORONARY ARTERY;  Surgeon: Sanam Gibbs MD;  [facility];  Service: Cardiology;  Laterality: Left;    EYE SURGERY  03/24/2021    lens implant,J&J, right eye    INSERTION OF BIVENTRICULAR IMPLANTABLE CARDIOVERTER-DEFIBRILLATOR (ICD) N/A 01/09/2020    Procedure: INSERTION, ICD, BIVENTRICULAR- BIOTRONIK;  Surgeon: Sanam Gibbs MD;  [facility];  Service: Cardiology;  Laterality: N/A;    INSERTION OF PACEMAKER  06/15/2016    INSERTION, STENT, BARE METAL, ARTERY, PERIPHERAL  4/28/2025    Procedure: PTA / Stent Rt. Common Iliac;  Surgeon: Dung Copeland MD;  Location: [facility] CATH;  Service: Cardiology;;    LEFT HEART CATHETERIZATION Left 07/12/2019    Procedure: Left heart cath;  Surgeon: Sanam Gibbs MD;  [facility];  Service: Cardiology;  Laterality: Left;    LIGATE GI BLEEDER      MITRAL VALVE REPLACEMENT  03/20/2008    with Cabg X 3    WRIST SURGERY Right      Family History       Problem Relation (Age of Onset)    Heart disease Father    No Known Problems Mother          Tobacco Use    Smoking status: Former     Current  packs/day: 0.00     Average packs/day: 1.5 packs/day for 40.0 years (60.0 ttl pk-yrs)     Types: Cigarettes     Start date: 1968     Quit date: 2008     Years since quittin.1    Smokeless tobacco: Never   Substance and Sexual Activity    Alcohol use: Yes     Alcohol/week: 4.0 standard drinks of alcohol     Types: 4 Glasses of wine per week     Comment: daily    Drug use: No    Sexual activity: Never     Review of Systems   All other systems reviewed and are negative.    Objective:     Vital Signs (Most Recent):  Temp: 98.6 °F (37 °C) (25)  Pulse: 60 (25)  Resp: 17 (25)  BP: (!) 159/72 (25)  SpO2: 95 % (25) Vital Signs (24h Range):  Temp:  [98 °F (36.7 °C)-98.7 °F (37.1 °C)] 98.6 °F (37 °C)  Pulse:  [] 60  Resp:  [16-18] 17  SpO2:  [95 %-97 %] 95 %  BP: (116-159)/(61-76) 159/72     Weight: 97.5 kg (215 lb)  Body mass index is 29.16 kg/m².      Physical Exam  Constitutional:       General: He is not in acute distress.  HENT:      Mouth/Throat:      Mouth: Mucous membranes are moist.   Cardiovascular:      Rate and Rhythm: Normal rate.      Comments:   Palpable femoral pulses, large L groin mass w/ significant ecchymosis  Palpable pedal pulses, 2+ B/L  No motor sensory deficits  Pulmonary:      Effort: Pulmonary effort is normal.   Neurological:      Mental Status: He is alert.      Sensory: No sensory deficit.      Motor: No weakness.          Significant Labs:  All pertinent labs from the last 24 hours have been reviewed.    Significant Diagnostics:  I have reviewed all pertinent imaging results/findings within the past 24 hours.    Assessment/Plan:     Pseudoaneurysm of femoral artery following procedure  L CFA PSA, post procedure at outside hospital.     Due to long neck, will try bedside thrombin injection once INR drifts (goal 1.6)    Please hold coumadin, NPO @ [facility]  Would be very wary to reverse INR in setting of mechanical valve but  ultimately defer to primary team  Daily INR, bed rest - does not need to lie flat        Thank you for your consult.     Angel Pate MD  Vascular Surgery Fellow  [facility] - Emergency Dept

## 2025-05-03 NOTE — ED PROVIDER NOTES
"Encounter Date: 5/2/2025       History     Chief Complaint   Patient presents with    pseudoaneurysm      "Left groin pain and swelling , testicle size of softballs, bruising noted , had angiogram few days ago" transfer from [facility] for vascular.     74-year-old male, history of CHF, pacemaker, CAD, AFib on Coumadin, transfer from outside facility for a pseudoaneurysm.  Patient had a cardiac angiogram on April 28th.  Procedure was complicated by inability to remove catheter from the left femoral artery resulting in a femoral artery perforation.  Patient reports significant swelling in that region postprocedure that has persisted.  He went to an outside ED today where he had a CT a done that showed a large left femoral artery pseudoaneurysm and he was transferred here for vascular surgery evaluation.    The history is provided by the patient.     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    Cardiac pacemaker 06/15/2016    PPM Gen change Eluna 8 DRT (sn [id number])    CHF (congestive heart failure) 12/03/2019    Class 2-3     Combined fat and carbohydrate induced hyperlipemia 1/19/2006    Coronary artery disease     stents 2;     Decreased cardiac ejection fraction 11/2019    Encounter for blood transfusion     Essential (primary) hypertension 8/30/2007    H/O: GI bleed     History of mitral valve replacement with mechanical valve 2008    Ischemic cardiomyopathy 11/2019    PAF (paroxysmal atrial fibrillation)     SSS (sick sinus syndrome)     Thyroid disease      Past Surgical History:   Procedure Laterality Date    ANGIOGRAM, ABDOMINAL AORTA  4/28/2025    Procedure: Abdominal angiogram;  Surgeon: Dung Copeland MD;  Location: [facility];  Service: Cardiology;;    ANGIOGRAM, CORONARY, WITH LEFT HEART CATHETERIZATION  4/28/2025    Procedure: Left Heart Cath;  Surgeon: Dung Copeland " MD ANN;  Location: [facility] CATH;  Service: Cardiology;;    CARDIAC PACEMAKER PLACEMENT      CARDIAC SURGERY      CARDIAC VALVE REPLACEMENT  03/20/2008    COLONOSCOPY N/A 09/06/2023    Procedure: COLONOSCOPY;  Surgeon: Dung Ruiz MD;  Location: [facility] ENDO;  Service: Endoscopy;  Laterality: N/A;    CORONARY ANGIOGRAPHY N/A 07/12/2019    Procedure: ANGIOGRAM, CORONARY ARTERY;  Surgeon: Sanam Gibbs MD;  Location: [facility] CATH;  Service: Cardiology;  Laterality: N/A;    CORONARY ANGIOGRAPHY N/A 08/22/2019    Procedure: ANGIOGRAM, CORONARY ARTERY;  Surgeon: Sanam Gibbs MD;  Location: [facility] CATH;  Service: Cardiology;  Laterality: N/A;    CORONARY ARTERY BYPASS GRAFT  03/20/2008    and valve replacement    CORONARY STENT PLACEMENT Left 08/22/2019    Procedure: INSERTION, STENT, CORONARY ARTERY;  Surgeon: Sanam Gibbs MD;  [facility];  Service: Cardiology;  Laterality: Left;    EYE SURGERY  03/24/2021    lens implant,J&J, right eye    INSERTION OF BIVENTRICULAR IMPLANTABLE CARDIOVERTER-DEFIBRILLATOR (ICD) N/A 01/09/2020    Procedure: INSERTION, ICD, BIVENTRICULAR- BIOTRONIK;  Surgeon: Sanam Gibbs MD;  Location: [facility] CATH;  Service: Cardiology;  Laterality: N/A;    INSERTION OF PACEMAKER  06/15/2016    INSERTION, STENT, BARE METAL, ARTERY, PERIPHERAL  4/28/2025    Procedure: PTA / Stent Rt. Common Iliac;  Surgeon: Dung Copeland MD;  Location: [facility] CATH;  Service: Cardiology;;    LEFT HEART CATHETERIZATION Left 07/12/2019    Procedure: Left heart cath;  Surgeon: Sanam Gibbs MD;  Location: [facility] CATH;  Service: Cardiology;  Laterality: Left;    LIGATE GI BLEEDER      MITRAL VALVE REPLACEMENT  03/20/2008    with Cabg X 3    WRIST SURGERY Right      Family History   Problem Relation Name Age of Onset    No Known Problems Mother      Heart disease Father father      Social History[1]  Review of Systems    Physical Exam     Initial Vitals [05/02/25 2117]   BP Pulse Resp Temp SpO2   132/73 102 16 98.7 °F (37.1 °C)  96 %      MAP       --         Physical Exam    Nursing note and vitals reviewed.  Constitutional: Vital signs are normal. He appears well-developed and well-nourished. He is not diaphoretic.  Non-toxic appearance. He does not appear ill. No distress.   HENT:   Head: Normocephalic and atraumatic. Mouth/Throat: Mucous membranes are normal. Mucous membranes are not dry.   Eyes: Conjunctivae and lids are normal.   Neck: Neck supple.   Normal range of motion.  Cardiovascular:  Normal rate.           Pulmonary/Chest: No respiratory distress.   Abdominal: Abdomen is soft. He exhibits distension.   Large firm pulsatile mass to the left lower quadrant above the inguinal ligament   Musculoskeletal:         General: No tenderness or edema.      Cervical back: Normal range of motion and neck supple.      Comments: Left lower extremity dorsalis pedis pulse 2 +, no swelling to the left leg     Neurological: He is alert and oriented to person, place, and time. He has normal strength.   Skin: Skin is dry and intact. No pallor.   Large amount of ecchymosis to the lower abdomen and scrotum and upper thighs bilaterally   Psychiatric: He has a normal mood and affect. His speech is normal and behavior is normal.         ED Course   Procedures  Labs Reviewed   PREPARE FRESH FROZEN PLASMA SOFT          Imaging Results    None          Medications   0.9%  NaCl infusion (for blood administration) (has no administration in time range)   sodium chloride 0.9% flush 10 mL (has no administration in time range)   melatonin tablet 6 mg (6 mg Oral Given 5/3/25 0131)   levothyroxine tablet 75 mcg (has no administration in time range)   amiodarone tablet 200 mg (has no administration in time range)   atorvastatin tablet 80 mg (has no administration in time range)   LORazepam injection 1 mg (1 mg Intravenous Given 5/2/25 1827)     Medical Decision Making  Left femoral artery pseudoaneurysm    Patient hemodynamically stable with good distal  pulses    Plan  -already had lab work at outside facility, INR 2.3  -vascular surgery consulted, will come evaluate the patient.    Amount and/or Complexity of Data Reviewed  External Data Reviewed: notes.    Risk  OTC drugs.  Prescription drug management.               ED Course as of 05/03/25 0152   Fri May 02, 2025   2226 I have discussed the case with vascular surgery - they are recommending we bring INR to < 1.5 with plans for direct injection of thrombin into aneurysm tomorrow morning at bedside.  Admit to medicine. [AS]    FFP ordered [AS]      ED Course User Index  [AS] Mamie Trent MD                           Clinical Impression:  Final diagnoses:  [I72.4] Pseudoaneurysm of femoral artery (Primary)          ED Disposition Condition    Observation                   [1]   Social History  Tobacco Use    Smoking status: Former     Current packs/day: 0.00     Average packs/day: 1.5 packs/day for 40.0 years (60.0 ttl pk-yrs)     Types: Cigarettes     Start date: 1968     Quit date: 2008     Years since quittin.1    Smokeless tobacco: Never   Substance Use Topics    Alcohol use: Yes     Alcohol/week: 4.0 standard drinks of alcohol     Types: 4 Glasses of wine per week     Comment: daily    Drug use: No        Mamie Trent MD  25

## 2025-05-03 NOTE — ED TRIAGE NOTES
"Frederick Bustos, a 74 y.o. male presents to the ED w/ complaint of pseudoaneurysm. Left groin pain and swelling , testicle size of softballs, bruising noted, all started after angiogram a few days ago" transfer from [facility] for vascular.     Triage note:  Chief Complaint   Patient presents with    pseudoaneurysm      "Left groin pain and swelling , testicle size of softballs, bruising noted , had angiogram few days ago" transfer from [facility] for vascular.     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

## 2025-05-03 NOTE — ASSESSMENT & PLAN NOTE
74-year-old male transferred from [facility] with a left common femoral artery pseudoaneurysm. He underwent LHC, abdominal angioram, and PTA-S of R MOUNIKA on 4/28. This was complicated by a groin access complication on the right that was treated in the right CFA from left groin access. His currrent groin access complication on the left.    Seen by Vascular Surgery who plans to inject thrombin directly into vessel at bedside with a post-procedural ultrasound.    -Hold anticoagulation  -Daily PT/INR  -Trend CBC  -NPO

## 2025-05-03 NOTE — SUBJECTIVE & OBJECTIVE
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    Cardiac pacemaker 06/15/2016    PPM Gen change Eluna 8 DRT (sn [id number])    CHF (congestive heart failure) 12/03/2019    Class 2-3     Combined fat and carbohydrate induced hyperlipemia 1/19/2006    Coronary artery disease     stents 2;     Decreased cardiac ejection fraction 11/2019    Encounter for blood transfusion     Essential (primary) hypertension 8/30/2007    H/O: GI bleed     History of mitral valve replacement with mechanical valve 2008    Ischemic cardiomyopathy 11/2019    PAF (paroxysmal atrial fibrillation)     SSS (sick sinus syndrome)     Thyroid disease      Past Surgical History:   Procedure Laterality Date    ANGIOGRAM, ABDOMINAL AORTA  4/28/2025    Procedure: Abdominal angiogram;  Surgeon: Dung Copeland MD;  Location: [facility] CATH;  Service: Cardiology;;    ANGIOGRAM, CORONARY, WITH LEFT HEART CATHETERIZATION  4/28/2025    Procedure: Left Heart Cath;  Surgeon: Dung Copeland MD;  Location: [facility] CATH;  Service: Cardiology;;    CARDIAC PACEMAKER PLACEMENT      CARDIAC SURGERY      CARDIAC VALVE REPLACEMENT  03/20/2008    COLONOSCOPY N/A 09/06/2023    Procedure: COLONOSCOPY;  Surgeon: Dung Ruiz MD;  Location: [facility] ENDO;  Service: Endoscopy;  Laterality: N/A;    CORONARY ANGIOGRAPHY N/A 07/12/2019    Procedure: ANGIOGRAM, CORONARY ARTERY;  Surgeon: Sanam Gibbs MD;  Location: [facility] CATH;  Service: Cardiology;  Laterality: N/A;    CORONARY ANGIOGRAPHY N/A 08/22/2019    Procedure: ANGIOGRAM, CORONARY ARTERY;  Surgeon: Sanam Gibbs MD;  Location: [facility] CATH;  Service: Cardiology;  Laterality: N/A;    CORONARY ARTERY BYPASS GRAFT  03/20/2008    and valve replacement    CORONARY STENT PLACEMENT Left 08/22/2019    Procedure: INSERTION, STENT, CORONARY ARTERY;  Surgeon: Sanam  MD Bernie;  Location: [facility] CATH;  Service: Cardiology;  Laterality: Left;    EYE SURGERY  2021    lens implant,J&J, right eye    INSERTION OF BIVENTRICULAR IMPLANTABLE CARDIOVERTER-DEFIBRILLATOR (ICD) N/A 2020    Procedure: INSERTION, ICD, BIVENTRICULAR- BIOTRONIK;  Surgeon: Sanam Gibbs MD;  Location: [facility] CATH;  Service: Cardiology;  Laterality: N/A;    INSERTION OF PACEMAKER  06/15/2016    INSERTION, STENT, BARE METAL, ARTERY, PERIPHERAL  2025    Procedure: PTA / Stent Rt. Common Iliac;  Surgeon: Dung Copeland MD;  Location: [facility] CATH;  Service: Cardiology;;    LEFT HEART CATHETERIZATION Left 2019    Procedure: Left heart cath;  Surgeon: Sanam Gibbs MD;  Location: [facility] CATH;  Service: Cardiology;  Laterality: Left;    LIGATE GI BLEEDER      MITRAL VALVE REPLACEMENT  2008    with Cabg X 3    WRIST SURGERY Right      Family History       Problem Relation (Age of Onset)    Heart disease Father    No Known Problems Mother          Tobacco Use    Smoking status: Former     Current packs/day: 0.00     Average packs/day: 1.5 packs/day for 40.0 years (60.0 ttl pk-yrs)     Types: Cigarettes     Start date: 1968     Quit date: 2008     Years since quittin.1    Smokeless tobacco: Never   Substance and Sexual Activity    Alcohol use: Yes     Alcohol/week: 4.0 standard drinks of alcohol     Types: 4 Glasses of wine per week     Comment: daily    Drug use: No    Sexual activity: Never     Review of Systems   All other systems reviewed and are negative.    Objective:     Vital Signs (Most Recent):  Temp: 98 °F (36.7 °C) (25)  Pulse: 63 (25)  Resp: 18 (25)  BP: (!) 149/69 (25)  SpO2: (!) 91 % (25) Vital Signs (24h Range):  Temp:  [97.8 °F (36.6 °C)-98.7 °F (37.1 °C)] 98 °F (36.7 °C)  Pulse:  [] 63  Resp:  [16-18] 18  SpO2:  [90 %-97 %] 91 %  BP: (116-159)/(59-76) 149/69     Weight: 97.1 kg (214 lb 1.1 oz)  Body mass  index is 29.03 kg/m².      Physical Exam  Constitutional:       General: He is not in acute distress.  HENT:      Mouth/Throat:      Mouth: Mucous membranes are moist.   Cardiovascular:      Rate and Rhythm: Normal rate.      Comments: Palpable femoral pulses, large L groin mass w/ significant ecchymosis  Palpable pedal pulses, 2+ B/L  No motor sensory deficits  Pulmonary:      Effort: Pulmonary effort is normal.   Neurological:      Mental Status: He is alert.      Sensory: No sensory deficit.      Motor: No weakness.          Significant Labs:  All pertinent labs from the last 24 hours have been reviewed.    Significant Diagnostics:  I have reviewed all pertinent imaging results/findings within the past 24 hours.       [1]   Medications Prior to Admission   Medication Sig Dispense Refill Last Dose/Taking    acetaminophen (TYLENOL) 500 mg Cap Take 1,000-1,500 mg by mouth 3 (three) times daily as needed.        amiodarone (PACERONE) 200 MG Tab Take 1 tablet (200 mg total) by mouth once daily. 90 tablet 2     amLODIPine (NORVASC) 5 MG tablet TAKE 1 TABLET EVERY DAY (Patient taking differently: Take 5 mg by mouth once daily.) 90 tablet 3     atorvastatin (LIPITOR) 80 MG tablet TAKE 1 TABLET EVERY DAY (Patient taking differently: Take 80 mg by mouth once daily.) 90 tablet 3     clopidogreL (PLAVIX) 75 mg tablet TAKE 1 TABLET ONE TIME DAILY (Patient taking differently: Take 75 mg by mouth nightly.) 90 tablet 3     enoxaparin (LOVENOX) 100 mg/mL Syrg Inject 1 mL (100 mg total) into the skin every 12 (twelve) hours. #10= 5 day supply 10 each 1     ezetimibe (ZETIA) 10 mg tablet TAKE 1 TABLET EVERY DAY (Patient taking differently: Take 10 mg by mouth once daily.) 90 tablet 3     fenofibrate 160 MG Tab TAKE 1 TABLET ONE TIME DAILY (Patient taking differently: Take 160 mg by mouth once daily.) 90 tablet 2     HYDROcodone-acetaminophen (NORCO) 5-325 mg per tablet Take 1 tablet by mouth every 8 (eight) hours as needed for  Pain. 10 tablet 0     levothyroxine (SYNTHROID) 75 MCG tablet Take 1 tablet (75 mcg total) by mouth before breakfast. 90 tablet 2     lisinopriL (PRINIVIL,ZESTRIL) 40 MG tablet TAKE 1 TABLET EVERY DAY (Patient taking differently: Take 40 mg by mouth once daily.) 90 tablet 3     metoprolol succinate (TOPROL-XL) 25 MG 24 hr tablet TAKE 1 TABLET EVERY DAY (Patient taking differently: Take 25 mg by mouth once daily.) 90 tablet 3     multivitamin capsule Take 1 capsule by mouth once daily.       mv-mn/iron/folic acid/herb 190 (VITAMIN D3 COMPLETE ORAL) Take 1 capsule by mouth nightly.       TURMERIC ORAL Take 1 capsule by mouth Daily.       warfarin (COUMADIN) 5 MG tablet TAKE AS DIRECTED (NEEDS APPOINTMENT BEFORE ANY FURTHER REFILLS) (Patient taking differently: Take 5 mg by mouth nightly. TAKE AS DIRECTED (NEEDS APPOINTMENT BEFORE ANY FURTHER REFILLS)) 120 tablet 2

## 2025-05-03 NOTE — ASSESSMENT & PLAN NOTE
Patient has paroxysmal (<7 days) atrial fibrillation. Patient is currently in sinus rhythm. [id number] Score: 3. The patients heart rate in the last 24 hours is as follows:  Pulse  Min: 59  Max: 102     Antiarrhythmics  amiodarone tablet 200 mg, Daily, Oral    Anticoagulants       Plan  - Replete lytes with a goal of K>4, Mg >2  - Patient is anticoagulated, see medications listed above.  - Patient's afib is currently controlled  - Continue amiodarone

## 2025-05-03 NOTE — HPI
This patient is a 74-year-old male transferred from outside hospital with left common femoral artery pseudoaneurysm.  Past medical history is significant for hypertension, hyperlipidemia, mechanical mitral valve on Coumadin, history of CABG x3 in 2008, coronary stent in 2019, history of laryngeal cancer status post adjuvant radiation, pacemaker, CHF, aortic stenosis, AFib.  On the 28th of April, underwent a percutaneous heart catheterization in preparation for apparent gallbladder surgery in the setting of symptomatic cholelithiasis.  This is a groin access complication. Denies motorsensory changes or significant pain.

## 2025-05-03 NOTE — H&P
"  [facility] - Cardiology [facility] Medicine  History & Physical    Patient Name: Frederick Bustos  MRN: [id number]  Patient Class: OP- Observation  Admission Date: 5/2/2025  Attending Physician: Cain Sheridan*   Primary Care Provider: Matt Bell MD         Patient information was obtained from patient, past medical records, and ER records.     Subjective:     Principal Problem:Pseudoaneurysm of femoral artery following procedure    Chief Complaint:   Chief Complaint   Patient presents with    pseudoaneurysm      "Left groin pain and swelling , testicle size of softballs, bruising noted , had angiogram few days ago" transfer from [facility] for vascular.        HPI: Frederick Bustos is a 74-year-old male with hypertension, hyperlipidemia, mechanical mitral valve on Coumadin, history of CABG x3 in 2008, coronary stent in 2019, history of laryngeal cancer status post adjuvant radiation, pacemaker, CHF, aortic stenosis, AFib who was transferred from [facility] with a left common femoral artery pseudoaneurysm. He underwent LHC, abdominal angioram, and PTA-S of R MOUNIKA on 4/28. This was complicated by a groin access complication on the right that was treated in the right CFA from left groin access. His currrent groin access complication on the left. Denies fever, chills, nausea, emesis, abdominal pain, leg swelling, weakness, and sensory changes. He was seen by Vascular Surgery who plans to inject thrombin directly into vessel at bedside with a post-procedural ultrasound. Anticipate rapid discharge after this. ED provider spoke to Cardiology and noted that because he has a mechanical heart valve they recommend just holding the Coumadin and rechecking tomorrow morning.    The patient was admitted to Hospital Medicine for further workup and management.    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    Cardiac pacemaker 06/15/2016    PPM Gen change " Fili 8 DRT (sn [id number])    CHF (congestive heart failure) 12/03/2019    Class 2-3     Combined fat and carbohydrate induced hyperlipemia 1/19/2006    Coronary artery disease     stents 2;     Decreased cardiac ejection fraction 11/2019    Encounter for blood transfusion     Essential (primary) hypertension 8/30/2007    H/O: GI bleed     History of mitral valve replacement with mechanical valve 2008    Ischemic cardiomyopathy 11/2019    PAF (paroxysmal atrial fibrillation)     SSS (sick sinus syndrome)     Thyroid disease        Past Surgical History:   Procedure Laterality Date    ANGIOGRAM, ABDOMINAL AORTA  4/28/2025    Procedure: Abdominal angiogram;  Surgeon: Dung Copeland MD;  Location: [facility] CATH;  Service: Cardiology;;    ANGIOGRAM, CORONARY, WITH LEFT HEART CATHETERIZATION  4/28/2025    Procedure: Left Heart Cath;  Surgeon: Dung Copeland MD;  Location: [facility] CATH;  Service: Cardiology;;    CARDIAC PACEMAKER PLACEMENT      CARDIAC SURGERY      CARDIAC VALVE REPLACEMENT  03/20/2008    COLONOSCOPY N/A 09/06/2023    Procedure: COLONOSCOPY;  Surgeon: Dung Ruiz MD;  Location: [facility] ENDO;  Service: Endoscopy;  Laterality: N/A;    CORONARY ANGIOGRAPHY N/A 07/12/2019    Procedure: ANGIOGRAM, CORONARY ARTERY;  Surgeon: Sanam Gibbs MD;  Location: [facility] CATH;  Service: Cardiology;  Laterality: N/A;    CORONARY ANGIOGRAPHY N/A 08/22/2019    Procedure: ANGIOGRAM, CORONARY ARTERY;  Surgeon: Sanam Gibbs MD;  Location: [facility] CATH;  Service: Cardiology;  Laterality: N/A;    CORONARY ARTERY BYPASS GRAFT  03/20/2008    and valve replacement    CORONARY STENT PLACEMENT Left 08/22/2019    Procedure: INSERTION, STENT, CORONARY ARTERY;  Surgeon: Sanam Gibbs MD;  Location: [facility] CATH;  Service: Cardiology;  Laterality: Left;    EYE SURGERY  03/24/2021    lens implant,J&J, right eye    INSERTION OF BIVENTRICULAR IMPLANTABLE CARDIOVERTER-DEFIBRILLATOR (ICD) N/A 01/09/2020    Procedure: INSERTION, ICD, BIVENTRICULAR-  BIOTRONIK;  Surgeon: Sanam Gibbs MD;  Location: [facility] CATH;  Service: Cardiology;  Laterality: N/A;    INSERTION OF PACEMAKER  06/15/2016    INSERTION, STENT, BARE METAL, ARTERY, PERIPHERAL  4/28/2025    Procedure: PTA / Stent Rt. Common Iliac;  Surgeon: Dung Copeland MD;  Location: [facility] CATH;  Service: Cardiology;;    LEFT HEART CATHETERIZATION Left 07/12/2019    Procedure: Left heart cath;  Surgeon: Sanam Gibbs MD;  Location: [facility] CATH;  Service: Cardiology;  Laterality: Left;    LIGATE GI BLEEDER      MITRAL VALVE REPLACEMENT  03/20/2008    with Cabg X 3    WRIST SURGERY Right        Review of patient's allergies indicates:  No Known Allergies    Current Facility-Administered Medications on File Prior to Encounter   Medication    [COMPLETED] iohexoL (OMNIPAQUE 350) injection 80 mL    lactated ringers infusion    [COMPLETED] morphine injection 2 mg    sodium chloride 0.9% flush 10 mL     Current Outpatient Medications on File Prior to Encounter   Medication Sig    acetaminophen (TYLENOL) 500 mg Cap Take 1,000-1,500 mg by mouth 3 (three) times daily as needed.     amiodarone (PACERONE) 200 MG Tab Take 1 tablet (200 mg total) by mouth once daily.    amLODIPine (NORVASC) 5 MG tablet TAKE 1 TABLET EVERY DAY (Patient taking differently: Take 5 mg by mouth once daily.)    atorvastatin (LIPITOR) 80 MG tablet TAKE 1 TABLET EVERY DAY (Patient taking differently: Take 80 mg by mouth once daily.)    clopidogreL (PLAVIX) 75 mg tablet TAKE 1 TABLET ONE TIME DAILY (Patient taking differently: Take 75 mg by mouth nightly.)    enoxaparin (LOVENOX) 100 mg/mL Syrg Inject 1 mL (100 mg total) into the skin every 12 (twelve) hours. #10= 5 day supply    ezetimibe (ZETIA) 10 mg tablet TAKE 1 TABLET EVERY DAY (Patient taking differently: Take 10 mg by mouth once daily.)    fenofibrate 160 MG Tab TAKE 1 TABLET ONE TIME DAILY (Patient taking differently: Take 160 mg by mouth once daily.)    HYDROcodone-acetaminophen (NORCO) 5-325 mg  per tablet Take 1 tablet by mouth every 8 (eight) hours as needed for Pain.    levothyroxine (SYNTHROID) 75 MCG tablet Take 1 tablet (75 mcg total) by mouth before breakfast.    lisinopriL (PRINIVIL,ZESTRIL) 40 MG tablet TAKE 1 TABLET EVERY DAY (Patient taking differently: Take 40 mg by mouth once daily.)    metoprolol succinate (TOPROL-XL) 25 MG 24 hr tablet TAKE 1 TABLET EVERY DAY (Patient taking differently: Take 25 mg by mouth once daily.)    multivitamin capsule Take 1 capsule by mouth once daily.    mv-mn/iron/folic acid/herb 190 (VITAMIN D3 COMPLETE ORAL) Take 1 capsule by mouth nightly.    TURMERIC ORAL Take 1 capsule by mouth Daily.    warfarin (COUMADIN) 5 MG tablet TAKE AS DIRECTED (NEEDS APPOINTMENT BEFORE ANY FURTHER REFILLS) (Patient taking differently: Take 5 mg by mouth nightly. TAKE AS DIRECTED (NEEDS APPOINTMENT BEFORE ANY FURTHER REFILLS))     Family History       Problem Relation (Age of Onset)    Heart disease Father    No Known Problems Mother          Tobacco Use    Smoking status: Former     Current packs/day: 0.00     Average packs/day: 1.5 packs/day for 40.0 years (60.0 ttl pk-yrs)     Types: Cigarettes     Start date: 1968     Quit date: 2008     Years since quittin.1    Smokeless tobacco: Never   Substance and Sexual Activity    Alcohol use: Yes     Alcohol/week: 4.0 standard drinks of alcohol     Types: 4 Glasses of wine per week     Comment: daily    Drug use: No    Sexual activity: Never     Review of Systems   Constitutional:  Positive for fatigue. Negative for chills and fever.   Eyes:  Negative for visual disturbance.   Respiratory:  Negative for cough and shortness of breath.    Cardiovascular:  Negative for chest pain and leg swelling.   Gastrointestinal:  Negative for abdominal pain, diarrhea, nausea and vomiting.   Genitourinary:  Positive for scrotal swelling. Negative for flank pain and hematuria.   Musculoskeletal:  Positive for back pain and myalgias.   Skin:   Positive for color change.   Psychiatric/Behavioral:  Negative for confusion and decreased concentration.      Objective:     Vital Signs (Most Recent):  Temp: 98.2 °F (36.8 °C) (05/03/25 0033)  Pulse: 60 (05/03/25 0225)  Resp: 16 (05/03/25 0033)  BP: (!) 146/65 (05/03/25 0033)  SpO2: 97 % (05/03/25 0033) Vital Signs (24h Range):  Temp:  [98 °F (36.7 °C)-98.7 °F (37.1 °C)] 98.2 °F (36.8 °C)  Pulse:  [] 60  Resp:  [16-18] 16  SpO2:  [90 %-97 %] 97 %  BP: (116-159)/(59-76) 146/65     Weight: 97.1 kg (214 lb 1.1 oz)  Body mass index is 29.03 kg/m².     Physical Exam  Vitals reviewed.   Constitutional:       General: He is not in acute distress.     Appearance: He is not ill-appearing.   HENT:      Head: Normocephalic and atraumatic.      Mouth/Throat:      Mouth: Mucous membranes are moist.      Pharynx: Oropharynx is clear.   Eyes:      General: No scleral icterus.     Conjunctiva/sclera: Conjunctivae normal.   Cardiovascular:      Rate and Rhythm: Normal rate and regular rhythm.      Pulses: Normal pulses.      Comments: Ejection click  Abdominal:      General: There is no distension.      Palpations: Abdomen is soft.      Tenderness: There is no abdominal tenderness. There is no guarding.   Genitourinary:     Comments: Swelling and bruising to scrotum  Musculoskeletal:      Right lower leg: No edema.      Left lower leg: No edema.      Comments: Extensive bilateral bruising to thighs   Skin:     General: Skin is warm and dry.      Findings: Bruising present.   Neurological:      General: No focal deficit present.      Mental Status: He is alert. Mental status is at baseline.   Psychiatric:         Mood and Affect: Mood normal.         Behavior: Behavior normal.                Significant Labs: All pertinent labs within the past 24 hours have been reviewed.  CBC:   Recent Labs   Lab 05/02/25  1353   WBC 7.24   HGB 10.1*   HCT 30.3*        CMP:   Recent Labs   Lab 05/02/25  1353      K 4.0   *    CO2 26      BUN 29*   CREATININE 1.28   CALCIUM 8.7   PROT 6.5   ALBUMIN 3.5   BILITOT 0.9   ALKPHOS 50   AST 28   ALT 13   ANIONGAP 4*     Coagulation:   Recent Labs   Lab 05/02/25  1353   INR 2.3   APTT 56.1*       Significant Imaging: I have reviewed all pertinent imaging results/findings within the past 24 hours.  Assessment/Plan:     Assessment & Plan  Pseudoaneurysm of femoral artery following procedure  74-year-old male transferred [facility] with a left common femoral artery pseudoaneurysm. He underwent LHC, abdominal angioram, and PTA-S of R MOUNIKA on 4/28. This was complicated by a groin access complication on the right that was treated in the right CFA from left groin access. His currrent groin access complication on the left.    Seen by Vascular Surgery who plans to inject thrombin directly into vessel at bedside with a post-procedural ultrasound.    -Hold anticoagulation  -Daily PT/INR  -Trend CBC  -NPO  Essential (primary) hypertension  Patient's blood pressure range in the last 24 hours was: BP  Min: 116/61  Max: 159/72.The patient's inpatient anti-hypertensive regimen is listed below:  Current Antihypertensives       Plan  - BP is controlled, no changes needed to their regimen  - Normotensive on admit, antihypertensives held  Anticoagulated on Coumadin  On coumadin.    -hold coumadin due to pseudoaneurysm  -daily PT/INR  -if INR not less than 1.5 morning of 5/3, order FFP    Mixed hypercholesterolemia and hypertriglyceridemia  Stable    -continue statin    PAF (paroxysmal atrial fibrillation)  Patient has paroxysmal (<7 days) atrial fibrillation. Patient is currently in sinus rhythm. [id number] Score: 3. The patients heart rate in the last 24 hours is as follows:  Pulse  Min: 59  Max: 102     Antiarrhythmics  amiodarone tablet 200 mg, Daily, Oral    Anticoagulants       Plan  - Replete lytes with a goal of K>4, Mg >2  - Patient is anticoagulated, see medications listed above.  - Patient's afib is  currently controlled  - Continue amiodarone  VTE Risk Mitigation (From admission, onward)           Ordered     IP VTE HIGH RISK PATIENT  Once         05/03/25 0203     Place sequential compression device  Until discontinued         05/03/25 0203     Reason for No Pharmacological VTE Prophylaxis  Once        Question:  Reasons:  Answer:  Risk of Bleeding    05/03/25 0203     Place sequential compression device  Until discontinued         05/02/25 2321                       On 05/03/2025, patient should be placed in hospital observation services under my care.             Kole Pak MD  Department of Hospital Medicine  [facility] - Cardiology Stepdown

## 2025-05-03 NOTE — PROGRESS NOTES
[facility] - Cardiology Stepdown  Vascular Surgery  Progress Note    Patient Name: Frederick Bustos  MRN: [id number]  Admission Date: 5/2/2025  Primary Care Provider: Matt Bell MD    Subjective:     Interval History: No acute events overnight. INR 2.3. Denies motorsensory changes, groin pain.     Post-Op Info:  * No surgery found *       [Prescriptions Prior to Admission]    [Prescriptions Prior to Admission]  Medications Prior to Admission   Medication Sig Dispense Refill Last Dose/Taking    acetaminophen (TYLENOL) 500 mg Cap Take 1,000-1,500 mg by mouth 3 (three) times daily as needed.        amiodarone (PACERONE) 200 MG Tab Take 1 tablet (200 mg total) by mouth once daily. 90 tablet 2     amLODIPine (NORVASC) 5 MG tablet TAKE 1 TABLET EVERY DAY (Patient taking differently: Take 5 mg by mouth once daily.) 90 tablet 3     atorvastatin (LIPITOR) 80 MG tablet TAKE 1 TABLET EVERY DAY (Patient taking differently: Take 80 mg by mouth once daily.) 90 tablet 3     clopidogreL (PLAVIX) 75 mg tablet TAKE 1 TABLET ONE TIME DAILY (Patient taking differently: Take 75 mg by mouth nightly.) 90 tablet 3     enoxaparin (LOVENOX) 100 mg/mL Syrg Inject 1 mL (100 mg total) into the skin every 12 (twelve) hours. #10= 5 day supply 10 each 1     ezetimibe (ZETIA) 10 mg tablet TAKE 1 TABLET EVERY DAY (Patient taking differently: Take 10 mg by mouth once daily.) 90 tablet 3     fenofibrate 160 MG Tab TAKE 1 TABLET ONE TIME DAILY (Patient taking differently: Take 160 mg by mouth once daily.) 90 tablet 2     HYDROcodone-acetaminophen (NORCO) 5-325 mg per tablet Take 1 tablet by mouth every 8 (eight) hours as needed for Pain. 10 tablet 0     levothyroxine (SYNTHROID) 75 MCG tablet Take 1 tablet (75 mcg total) by mouth before breakfast. 90 tablet 2     lisinopriL (PRINIVIL,ZESTRIL) 40 MG tablet TAKE 1 TABLET EVERY DAY (Patient taking differently: Take 40 mg by mouth once daily.) 90 tablet 3     metoprolol succinate (TOPROL-XL) 25 MG 24 hr  tablet TAKE 1 TABLET EVERY DAY (Patient taking differently: Take 25 mg by mouth once daily.) 90 tablet 3     multivitamin capsule Take 1 capsule by mouth once daily.       mv-mn/iron/folic acid/herb 190 (VITAMIN D3 COMPLETE ORAL) Take 1 capsule by mouth nightly.       TURMERIC ORAL Take 1 capsule by mouth Daily.       warfarin (COUMADIN) 5 MG tablet TAKE AS DIRECTED (NEEDS APPOINTMENT BEFORE ANY FURTHER REFILLS) (Patient taking differently: Take 5 mg by mouth nightly. 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    Cardiac pacemaker 06/15/2016    PPM Gen change Eluna 8 DRT (sn [id number])    CHF (congestive heart failure) 12/03/2019    Class 2-3     Combined fat and carbohydrate induced hyperlipemia 1/19/2006    Coronary artery disease     stents 2;     Decreased cardiac ejection fraction 11/2019    Encounter for blood transfusion     Essential (primary) hypertension 8/30/2007    H/O: GI bleed     History of mitral valve replacement with mechanical valve 2008    Ischemic cardiomyopathy 11/2019    PAF (paroxysmal atrial fibrillation)     SSS (sick sinus syndrome)     Thyroid disease      Past Surgical History:   Procedure Laterality Date    ANGIOGRAM, ABDOMINAL AORTA  4/28/2025    Procedure: Abdominal angiogram;  Surgeon: Dung Copeland MD;  Location: [facility] CATH;  Service: Cardiology;;    ANGIOGRAM, CORONARY, WITH LEFT HEART CATHETERIZATION  4/28/2025    Procedure: Left Heart Cath;  Surgeon: Dung Copeland MD;  Location: [facility] CATH;  Service: Cardiology;;    CARDIAC PACEMAKER PLACEMENT      CARDIAC SURGERY      CARDIAC VALVE REPLACEMENT  03/20/2008    COLONOSCOPY N/A 09/06/2023    Procedure: COLONOSCOPY;  Surgeon: Dung Ruiz MD;  Location: [facility] ENDO;  Service: Endoscopy;  Laterality: N/A;     CORONARY ANGIOGRAPHY N/A 2019    Procedure: ANGIOGRAM, CORONARY ARTERY;  Surgeon: Sanam Gibbs MD;  Location: [facility] CATH;  Service: Cardiology;  Laterality: N/A;    CORONARY ANGIOGRAPHY N/A 2019    Procedure: ANGIOGRAM, CORONARY ARTERY;  Surgeon: Sanam Gibbs MD;  Location: [facility] CATH;  Service: Cardiology;  Laterality: N/A;    CORONARY ARTERY BYPASS GRAFT  2008    and valve replacement    CORONARY STENT PLACEMENT Left 2019    Procedure: INSERTION, STENT, CORONARY ARTERY;  Surgeon: Sanam Gibbs MD;  Location: [facility] CATH;  Service: Cardiology;  Laterality: Left;    EYE SURGERY  2021    lens implant,J&J, right eye    INSERTION OF BIVENTRICULAR IMPLANTABLE CARDIOVERTER-DEFIBRILLATOR (ICD) N/A 2020    Procedure: INSERTION, ICD, BIVENTRICULAR- BIOTRONIK;  Surgeon: Sanam Gibbs MD;  Location: [facility] CATH;  Service: Cardiology;  Laterality: N/A;    INSERTION OF PACEMAKER  06/15/2016    INSERTION, STENT, BARE METAL, ARTERY, PERIPHERAL  2025    Procedure: PTA / Stent Rt. Common Iliac;  Surgeon: Dung Copeland MD;  Location: [facility] CATH;  Service: Cardiology;;    LEFT HEART CATHETERIZATION Left 2019    Procedure: Left heart cath;  Surgeon: Sanam Gibbs MD;  Location: [facility] CATH;  Service: Cardiology;  Laterality: Left;    LIGATE GI BLEEDER      MITRAL VALVE REPLACEMENT  2008    with Cabg X 3    WRIST SURGERY Right      Family History       Problem Relation (Age of Onset)    Heart disease Father    No Known Problems Mother          Tobacco Use    Smoking status: Former     Current packs/day: 0.00     Average packs/day: 1.5 packs/day for 40.0 years (60.0 ttl pk-yrs)     Types: Cigarettes     Start date: 1968     Quit date: 2008     Years since quittin.1    Smokeless tobacco: Never   Substance and Sexual Activity    Alcohol use: Yes     Alcohol/week: 4.0 standard drinks of alcohol     Types: 4 Glasses of wine per week     Comment: daily    Drug use: No    Sexual  activity: Never     Review of Systems   All other systems reviewed and are negative.    Objective:     Vital Signs (Most Recent):  Temp: 98 °F (36.7 °C) (05/03/25 0736)  Pulse: 63 (05/03/25 0736)  Resp: 18 (05/03/25 0736)  BP: (!) 149/69 (05/03/25 0736)  SpO2: (!) 91 % (05/03/25 0736) Vital Signs (24h Range):  Temp:  [97.8 °F (36.6 °C)-98.7 °F (37.1 °C)] 98 °F (36.7 °C)  Pulse:  [] 63  Resp:  [16-18] 18  SpO2:  [90 %-97 %] 91 %  BP: (116-159)/(59-76) 149/69     Weight: 97.1 kg (214 lb 1.1 oz)  Body mass index is 29.03 kg/m².      Physical Exam  Constitutional:       General: He is not in acute distress.  HENT:      Mouth/Throat:      Mouth: Mucous membranes are moist.   Cardiovascular:      Rate and Rhythm: Normal rate.      Comments:   Palpable femoral pulses, large L groin mass w/ significant ecchymosis  Palpable pedal pulses, 2+ B/L  No motor sensory deficits  Pulmonary:      Effort: Pulmonary effort is normal.   Neurological:      Mental Status: He is alert.      Sensory: No sensory deficit.      Motor: No weakness.          Significant Labs:  All pertinent labs from the last 24 hours have been reviewed.    Significant Diagnostics:  I have reviewed all pertinent imaging results/findings within the past 24 hours.    Assessment/Plan:     * Pseudoaneurysm of femoral artery following procedure  L CFA PSA, post procedure at outside hospital.     Due to long neck, will try bedside thrombin injection once INR drifts (goal 1.6)  Please continue to hold coumadin,  can eat today .. NPO @ MN   Daily INR, bed rest - does not need to lie flat        Angel Pate MD  Vascular Surgery Fellow  [facility] - Cardiology Stepdown

## 2025-05-03 NOTE — ASSESSMENT & PLAN NOTE
Patient's blood pressure range in the last 24 hours was: BP  Min: 116/61  Max: 159/72.The patient's inpatient anti-hypertensive regimen is listed below:  Current Antihypertensives       Plan  - BP is controlled, no changes needed to their regimen  - Normotensive on admit, antihypertensives held

## 2025-05-04 LAB
ABSOLUTE EOSINOPHIL (OHS): 0.4 K/UL
ABSOLUTE MONOCYTE (OHS): 0.58 K/UL (ref 0.3–1)
ABSOLUTE NEUTROPHIL COUNT (OHS): 5.44 K/UL (ref 1.8–7.7)
ANION GAP (OHS): 8 MMOL/L (ref 8–16)
BASOPHILS # BLD AUTO: 0.05 K/UL
BASOPHILS NFR BLD AUTO: 0.7 %
BUN SERPL-MCNC: 18 MG/DL (ref 8–23)
CALCIUM SERPL-MCNC: 8.7 MG/DL (ref 8.7–10.5)
CHLORIDE SERPL-SCNC: 108 MMOL/L (ref 95–110)
CO2 SERPL-SCNC: 26 MMOL/L (ref 23–29)
CREAT SERPL-MCNC: 0.9 MG/DL (ref 0.5–1.4)
ERYTHROCYTE [DISTWIDTH] IN BLOOD BY AUTOMATED COUNT: 15.3 % (ref 11.5–14.5)
GFR SERPLBLD CREATININE-BSD FMLA CKD-EPI: >60 ML/MIN/1.73/M2
GLUCOSE SERPL-MCNC: 91 MG/DL (ref 70–110)
HCT VFR BLD AUTO: 32.7 % (ref 40–54)
HGB BLD-MCNC: 10.3 GM/DL (ref 14–18)
IMM GRANULOCYTES # BLD AUTO: 0.05 K/UL (ref 0–0.04)
IMM GRANULOCYTES NFR BLD AUTO: 0.7 % (ref 0–0.5)
INR PPP: 2 (ref 0.8–1.2)
LYMPHOCYTES # BLD AUTO: 0.56 K/UL (ref 1–4.8)
MCH RBC QN AUTO: 30.1 PG (ref 27–31)
MCHC RBC AUTO-ENTMCNC: 31.5 G/DL (ref 32–36)
MCV RBC AUTO: 96 FL (ref 82–98)
NUCLEATED RBC (/100WBC) (OHS): 0 /100 WBC
PLATELET # BLD AUTO: 218 K/UL (ref 150–450)
PMV BLD AUTO: 11.1 FL (ref 9.2–12.9)
POTASSIUM SERPL-SCNC: 3.7 MMOL/L (ref 3.5–5.1)
PROTHROMBIN TIME: 21 SECONDS (ref 9–12.5)
RBC # BLD AUTO: 3.42 M/UL (ref 4.6–6.2)
RELATIVE EOSINOPHIL (OHS): 5.6 %
RELATIVE LYMPHOCYTE (OHS): 7.9 % (ref 18–48)
RELATIVE MONOCYTE (OHS): 8.2 % (ref 4–15)
RELATIVE NEUTROPHIL (OHS): 76.9 % (ref 38–73)
SODIUM SERPL-SCNC: 142 MMOL/L (ref 136–145)
WBC # BLD AUTO: 7.08 K/UL (ref 3.9–12.7)

## 2025-05-04 PROCEDURE — 25000003 PHARM REV CODE 250

## 2025-05-04 PROCEDURE — 99232 SBSQ HOSP IP/OBS MODERATE 35: CPT | Mod: 25,,, | Performed by: SURGERY

## 2025-05-04 PROCEDURE — 25000003 PHARM REV CODE 250: Performed by: STUDENT IN AN ORGANIZED HEALTH CARE EDUCATION/TRAINING PROGRAM

## 2025-05-04 PROCEDURE — 36002 PSEUDOANEURYSM INJECTION TRT: CPT | Mod: LT,,, | Performed by: SURGERY

## 2025-05-04 PROCEDURE — 3E05317 INTRODUCTION OF OTHER THROMBOLYTIC INTO PERIPHERAL ARTERY, PERCUTANEOUS APPROACH: ICD-10-PCS | Performed by: SURGERY

## 2025-05-04 PROCEDURE — 76942 ECHO GUIDE FOR BIOPSY: CPT | Mod: 26,,, | Performed by: SURGERY

## 2025-05-04 PROCEDURE — 36415 COLL VENOUS BLD VENIPUNCTURE: CPT

## 2025-05-04 PROCEDURE — 25000003 PHARM REV CODE 250: Performed by: EMERGENCY MEDICINE

## 2025-05-04 PROCEDURE — 63600175 PHARM REV CODE 636 W HCPCS: Performed by: STUDENT IN AN ORGANIZED HEALTH CARE EDUCATION/TRAINING PROGRAM

## 2025-05-04 PROCEDURE — 20600001 HC STEP DOWN PRIVATE ROOM

## 2025-05-04 PROCEDURE — 80048 BASIC METABOLIC PNL TOTAL CA: CPT

## 2025-05-04 PROCEDURE — 96376 TX/PRO/DX INJ SAME DRUG ADON: CPT

## 2025-05-04 RX ORDER — LIDOCAINE HYDROCHLORIDE 10 MG/ML
10 INJECTION, SOLUTION INFILTRATION; PERINEURAL ONCE
Status: DISCONTINUED | OUTPATIENT
Start: 2025-05-04 | End: 2025-05-05 | Stop reason: HOSPADM

## 2025-05-04 RX ORDER — LORAZEPAM 2 MG/ML
0.5 INJECTION INTRAMUSCULAR ONCE
Status: COMPLETED | OUTPATIENT
Start: 2025-05-04 | End: 2025-05-04

## 2025-05-04 RX ORDER — SIMETHICONE 80 MG
1 TABLET,CHEWABLE ORAL 3 TIMES DAILY PRN
Status: DISCONTINUED | OUTPATIENT
Start: 2025-05-05 | End: 2025-05-05 | Stop reason: HOSPADM

## 2025-05-04 RX ADMIN — LEVOTHYROXINE SODIUM 75 MCG: 75 TABLET ORAL at 06:05

## 2025-05-04 RX ADMIN — ATORVASTATIN CALCIUM 80 MG: 40 TABLET, FILM COATED ORAL at 08:05

## 2025-05-04 RX ADMIN — OXYCODONE 5 MG: 5 TABLET ORAL at 08:05

## 2025-05-04 RX ADMIN — METHOCARBAMOL 500 MG: 500 TABLET ORAL at 05:05

## 2025-05-04 RX ADMIN — ACETAMINOPHEN 650 MG: 325 TABLET ORAL at 05:05

## 2025-05-04 RX ADMIN — METOPROLOL SUCCINATE 25 MG: 25 TABLET, EXTENDED RELEASE ORAL at 08:05

## 2025-05-04 RX ADMIN — METHOCARBAMOL 500 MG: 500 TABLET ORAL at 08:05

## 2025-05-04 RX ADMIN — AMIODARONE HYDROCHLORIDE 200 MG: 200 TABLET ORAL at 08:05

## 2025-05-04 RX ADMIN — METHOCARBAMOL 500 MG: 500 TABLET ORAL at 12:05

## 2025-05-04 RX ADMIN — ACETAMINOPHEN 650 MG: 325 TABLET ORAL at 11:05

## 2025-05-04 RX ADMIN — ACETAMINOPHEN 650 MG: 325 TABLET ORAL at 06:05

## 2025-05-04 RX ADMIN — OXYCODONE 5 MG: 5 TABLET ORAL at 12:05

## 2025-05-04 RX ADMIN — Medication 6 MG: at 08:05

## 2025-05-04 RX ADMIN — LORAZEPAM 0.5 MG: 2 INJECTION INTRAMUSCULAR at 04:05

## 2025-05-04 NOTE — PLAN OF CARE
AAOX4,VSS,O2 sats>92% on room air.Plan of care discussed with patient.Patient has no complaints of pain/SOB. Discussed medications and care. Patient has no questions at this time.Pt visualised and stable.Call light within reach.Pt resting,bed at lowest position.      Problem: Adult Inpatient Plan of Care  Goal: Plan of Care Review  Outcome: Progressing  Goal: Patient-Specific Goal (Individualized)  Outcome: Progressing  Goal: Absence of Hospital-Acquired Illness or Injury  Outcome: Progressing  Goal: Optimal Comfort and Wellbeing  Outcome: Progressing  Goal: Readiness for Transition of Care  Outcome: Progressing     Problem: Infection  Goal: Absence of Infection Signs and Symptoms  Outcome: Progressing

## 2025-05-04 NOTE — OP NOTE
[facility] - Cardiology Stepdown  Vascular Surgery  Operative Note    SUMMARY     Date of Procedure:  5/4/2025    Procedure: US guided thrombin injection for pseudoaneurysm    Attending Surgeon: Stiven Mijares MD  Assisting Surgeon: Angel Pate, Fellow    Pre-Operative Diagnosis: L CFA pseudoaneurysm    Post-Operative Diagnosis: Same    Anesthesia: Local, 1% lidocaine, 10 cc    Operative Findings (including complications, if any):   Large PSA off CFA with to and fro flow on doppler.   Post intervention, loss of internal doppler flow in pseudoaneurysm  Palpable pedal pulse.    Bedrest x 24 hours.   Will obtain U/S in vascular lab tomorrow AM.     Description of Technical Procedures:   After informed consent was obtained, the left groin was prepped and draped in sterile fashion.  Under ultrasound guidance, the left pseudoaneurysm was identified and local anesthesia was infiltrated over our planned injection site.  Then, with a concentration of 250U in 0.5 mL of thrombin were injected into the pseudoaneurysm under ultrasound guidance.  We visualized loss of internal flow in the pseudoaneurysm and confirmed Doppler flow in the common femoral artery post postprocedure.  A dry sterile dressing was placed.  No complications were identified and the patient tolerated the procedure well.    Dr. Mijares was present and scrubbed for the entirety of the procedure.     Estimated Blood Loss (EBL): 1 cc           Implants: None    Specimens: None           Condition: Good    Disposition: Will stay in room.

## 2025-05-04 NOTE — SUBJECTIVE & OBJECTIVE
Interval History: No acute events. Per vascular surgery plan for bedside thrombin injection today     Review of Systems  Objective:     Vital Signs (Most Recent):  Temp: 98.8 °F (37.1 °C) (05/04/25 1525)  Pulse: 61 (05/04/25 1525)  Resp: 18 (05/04/25 1525)  BP: (!) 156/72 (05/04/25 1525)  SpO2: (!) 93 % (05/04/25 1525) Vital Signs (24h Range):  Temp:  [97.9 °F (36.6 °C)-98.8 °F (37.1 °C)] 98.8 °F (37.1 °C)  Pulse:  [59-79] 61  Resp:  [16-18] 18  SpO2:  [92 %-95 %] 93 %  BP: (119-159)/(58-74) 156/72     Weight: 97.1 kg (214 lb 1.1 oz)  Body mass index is 29.03 kg/m².    Intake/Output Summary (Last 24 hours) at 5/4/2025 1615  Last data filed at 5/4/2025 1119  Gross per 24 hour   Intake 402 ml   Output 550 ml   Net -148 ml         Physical Exam  Constitutional:       General: He is not in acute distress.  HENT:      Mouth/Throat:      Mouth: Mucous membranes are moist.   Cardiovascular:      Rate and Rhythm: Normal rate.      Comments: Palpable femoral pulses, large L groin mass w/ significant ecchymosis  Palpable pedal pulses, 2+ B/L  No motor sensory deficits  Pulmonary:      Effort: Pulmonary effort is normal.   Neurological:      Mental Status: He is alert.      Sensory: No sensory deficit.      Motor: No weakness.               Significant Labs: All pertinent labs within the past 24 hours have been reviewed.    Significant Imaging: I have reviewed all pertinent imaging results/findings within the past 24 hours.

## 2025-05-04 NOTE — ASSESSMENT & PLAN NOTE
74-year-old male transferred from [facility] with a left common femoral artery pseudoaneurysm. He underwent LHC, abdominal angioram, and PTA-S of R MOUNIKA on 4/28. This was complicated by a groin access complication on the right that was treated in the right CFA from left groin access. His currrent groin access complication on the left.    Seen by Vascular Surgery who plans to inject thrombin directly into vessel at bedside with a post-procedural ultrasound.    -Hold anticoagulation  -Daily PT/INR  -Trend CBC

## 2025-05-04 NOTE — SUBJECTIVE & OBJECTIVE
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    Cardiac pacemaker 06/15/2016    PPM Gen change Eluna 8 DRT (sn [id number])    CHF (congestive heart failure) 12/03/2019    Class 2-3     Combined fat and carbohydrate induced hyperlipemia 1/19/2006    Coronary artery disease     stents 2;     Decreased cardiac ejection fraction 11/2019    Encounter for blood transfusion     Essential (primary) hypertension 8/30/2007    H/O: GI bleed     History of mitral valve replacement with mechanical valve 2008    Ischemic cardiomyopathy 11/2019    PAF (paroxysmal atrial fibrillation)     SSS (sick sinus syndrome)     Thyroid disease      Past Surgical History:   Procedure Laterality Date    ANGIOGRAM, ABDOMINAL AORTA  4/28/2025    Procedure: Abdominal angiogram;  Surgeon: Dung Copeland MD;  Location: [facility] CATH;  Service: Cardiology;;    ANGIOGRAM, CORONARY, WITH LEFT HEART CATHETERIZATION  4/28/2025    Procedure: Left Heart Cath;  Surgeon: Dung Copeland MD;  Location: [facility] CATH;  Service: Cardiology;;    CARDIAC PACEMAKER PLACEMENT      CARDIAC SURGERY      CARDIAC VALVE REPLACEMENT  03/20/2008    COLONOSCOPY N/A 09/06/2023    Procedure: COLONOSCOPY;  Surgeon: Dung Ruiz MD;  Location: [facility] ENDO;  Service: Endoscopy;  Laterality: N/A;    CORONARY ANGIOGRAPHY N/A 07/12/2019    Procedure: ANGIOGRAM, CORONARY ARTERY;  Surgeon: Sanam Gibbs MD;  Location: [facility] CATH;  Service: Cardiology;  Laterality: N/A;    CORONARY ANGIOGRAPHY N/A 08/22/2019    Procedure: ANGIOGRAM, CORONARY ARTERY;  Surgeon: Sanam Gibbs MD;  Location: [facility] CATH;  Service: Cardiology;  Laterality: N/A;    CORONARY ARTERY BYPASS GRAFT  03/20/2008    and valve replacement    CORONARY STENT PLACEMENT Left 08/22/2019    Procedure: INSERTION, STENT, CORONARY ARTERY;  Surgeon: Sanam  MD Bernie;  Location: [facility] CATH;  Service: Cardiology;  Laterality: Left;    EYE SURGERY  2021    lens implant,J&J, right eye    INSERTION OF BIVENTRICULAR IMPLANTABLE CARDIOVERTER-DEFIBRILLATOR (ICD) N/A 2020    Procedure: INSERTION, ICD, BIVENTRICULAR- BIOTRONIK;  Surgeon: Sanam Gibbs MD;  Location: [facility] CATH;  Service: Cardiology;  Laterality: N/A;    INSERTION OF PACEMAKER  06/15/2016    INSERTION, STENT, BARE METAL, ARTERY, PERIPHERAL  2025    Procedure: PTA / Stent Rt. Common Iliac;  Surgeon: Dung Copeland MD;  Location: [facility] CATH;  Service: Cardiology;;    LEFT HEART CATHETERIZATION Left 2019    Procedure: Left heart cath;  Surgeon: Sanam Gibbs MD;  Location: [facility] CATH;  Service: Cardiology;  Laterality: Left;    LIGATE GI BLEEDER      MITRAL VALVE REPLACEMENT  2008    with Cabg X 3    WRIST SURGERY Right      Family History       Problem Relation (Age of Onset)    Heart disease Father    No Known Problems Mother          Tobacco Use    Smoking status: Former     Current packs/day: 0.00     Average packs/day: 1.5 packs/day for 40.0 years (60.0 ttl pk-yrs)     Types: Cigarettes     Start date: 1968     Quit date: 2008     Years since quittin.1    Smokeless tobacco: Never   Substance and Sexual Activity    Alcohol use: Yes     Alcohol/week: 4.0 standard drinks of alcohol     Types: 4 Glasses of wine per week     Comment: daily    Drug use: No    Sexual activity: Never     Review of Systems   All other systems reviewed and are negative.    Objective:     Vital Signs (Most Recent):  Temp: 98 °F (36.7 °C) (25)  Pulse: 60 (25 0753)  Resp: 18 (25)  BP: 132/62 (25 0734)  SpO2: 95 % (25) Vital Signs (24h Range):  Temp:  [97.9 °F (36.6 °C)-98.3 °F (36.8 °C)] 98 °F (36.7 °C)  Pulse:  [60-79] 60  Resp:  [16-18] 18  SpO2:  [92 %-95 %] 95 %  BP: (113-139)/(56-74) 132/62     Weight: 97.1 kg (214 lb 1.1 oz)  Body mass index is  29.03 kg/m².      Physical Exam  Constitutional:       General: He is not in acute distress.  HENT:      Mouth/Throat:      Mouth: Mucous membranes are moist.   Cardiovascular:      Rate and Rhythm: Normal rate.      Comments: Palpable femoral pulses, large L groin mass w/ significant ecchymosis  Palpable pedal pulses, 2+ B/L  No motor sensory deficits  Pulmonary:      Effort: Pulmonary effort is normal.   Neurological:      Mental Status: He is alert.      Sensory: No sensory deficit.      Motor: No weakness.          Significant Labs:  All pertinent labs from the last 24 hours have been reviewed.    Significant Diagnostics:  I have reviewed all pertinent imaging results/findings within the past 24 hours.       [1]   Medications Prior to Admission   Medication Sig Dispense Refill Last Dose/Taking    acetaminophen (TYLENOL) 500 mg Cap Take 1,000-1,500 mg by mouth 3 (three) times daily as needed.        amiodarone (PACERONE) 200 MG Tab Take 1 tablet (200 mg total) by mouth once daily. 90 tablet 2     amLODIPine (NORVASC) 5 MG tablet TAKE 1 TABLET EVERY DAY (Patient taking differently: Take 5 mg by mouth once daily.) 90 tablet 3     atorvastatin (LIPITOR) 80 MG tablet TAKE 1 TABLET EVERY DAY (Patient taking differently: Take 80 mg by mouth once daily.) 90 tablet 3     clopidogreL (PLAVIX) 75 mg tablet TAKE 1 TABLET ONE TIME DAILY (Patient taking differently: Take 75 mg by mouth nightly.) 90 tablet 3     enoxaparin (LOVENOX) 100 mg/mL Syrg Inject 1 mL (100 mg total) into the skin every 12 (twelve) hours. #10= 5 day supply 10 each 1     ezetimibe (ZETIA) 10 mg tablet TAKE 1 TABLET EVERY DAY (Patient taking differently: Take 10 mg by mouth once daily.) 90 tablet 3     fenofibrate 160 MG Tab TAKE 1 TABLET ONE TIME DAILY (Patient taking differently: Take 160 mg by mouth once daily.) 90 tablet 2     HYDROcodone-acetaminophen (NORCO) 5-325 mg per tablet Take 1 tablet by mouth every 8 (eight) hours as needed for Pain. 10  tablet 0     levothyroxine (SYNTHROID) 75 MCG tablet Take 1 tablet (75 mcg total) by mouth before breakfast. 90 tablet 2     lisinopriL (PRINIVIL,ZESTRIL) 40 MG tablet TAKE 1 TABLET EVERY DAY (Patient taking differently: Take 40 mg by mouth once daily.) 90 tablet 3     metoprolol succinate (TOPROL-XL) 25 MG 24 hr tablet TAKE 1 TABLET EVERY DAY (Patient taking differently: Take 25 mg by mouth once daily.) 90 tablet 3     multivitamin capsule Take 1 capsule by mouth once daily.       mv-mn/iron/folic acid/herb 190 (VITAMIN D3 COMPLETE ORAL) Take 1 capsule by mouth nightly.       TURMERIC ORAL Take 1 capsule by mouth Daily.       warfarin (COUMADIN) 5 MG tablet TAKE AS DIRECTED (NEEDS APPOINTMENT BEFORE ANY FURTHER REFILLS) (Patient taking differently: Take 5 mg by mouth nightly. TAKE AS DIRECTED (NEEDS APPOINTMENT BEFORE ANY FURTHER REFILLS)) 120 tablet 2

## 2025-05-04 NOTE — PROGRESS NOTES
[facility] - Cardiology [facility] Hospital Medicine  Progress Note    Patient Name: Frederikc Bustos  MRN: [id number]  Patient Class: OP- Observation   Admission Date: 5/2/2025  Length of Stay: 0 days  Attending Physician: Cain Sheridan*  Primary Care Provider: Matt Bell MD        Subjective     Principal Problem:Pseudoaneurysm of femoral artery following procedure        HPI:  Frederick Bustos is a 74-year-old male with hypertension, hyperlipidemia, mechanical mitral valve on Coumadin, history of CABG x3 in 2008, coronary stent in 2019, history of laryngeal cancer status post adjuvant radiation, pacemaker, CHF, aortic stenosis, AFib who was transferred from [facility] with a left common femoral artery pseudoaneurysm. He underwent LHC, abdominal angioram, and PTA-S of R MOUNIKA on 4/28. This was complicated by a groin access complication on the right that was treated in the right CFA from left groin access. His currrent groin access complication on the left. Denies fever, chills, nausea, emesis, abdominal pain, leg swelling, weakness, and sensory changes. He was seen by Vascular Surgery who plans to inject thrombin directly into vessel at bedside with a post-procedural ultrasound. Anticipate rapid discharge after this. ED provider spoke to Cardiology and noted that because he has a mechanical heart valve they recommend just holding the Coumadin and rechecking tomorrow morning.    The patient was admitted to Hospital Medicine for further workup and management.    Overview/Hospital Course:  No notes on file    Interval History: No acute events. Per vascular surgery plan for bedside thrombin injection today     Review of Systems  Objective:     Vital Signs (Most Recent):  Temp: 98.8 °F (37.1 °C) (05/04/25 1525)  Pulse: 61 (05/04/25 1525)  Resp: 18 (05/04/25 1525)  BP: (!) 156/72 (05/04/25 1525)  SpO2: (!) 93 % (05/04/25 1525) Vital Signs (24h Range):  Temp:  [97.9 °F (36.6 °C)-98.8 °F (37.1 °C)] 98.8 °F (37.1 °C)  Pulse:   [59-79] 61  Resp:  [16-18] 18  SpO2:  [92 %-95 %] 93 %  BP: (119-159)/(58-74) 156/72     Weight: 97.1 kg (214 lb 1.1 oz)  Body mass index is 29.03 kg/m².    Intake/Output Summary (Last 24 hours) at 5/4/2025 1615  Last data filed at 5/4/2025 1119  Gross per 24 hour   Intake 402 ml   Output 550 ml   Net -148 ml         Physical Exam  Constitutional:       General: He is not in acute distress.  HENT:      Mouth/Throat:      Mouth: Mucous membranes are moist.   Cardiovascular:      Rate and Rhythm: Normal rate.      Comments: Palpable femoral pulses, large L groin mass w/ significant ecchymosis  Palpable pedal pulses, 2+ B/L  No motor sensory deficits  Pulmonary:      Effort: Pulmonary effort is normal.   Neurological:      Mental Status: He is alert.      Sensory: No sensory deficit.      Motor: No weakness.               Significant Labs: All pertinent labs within the past 24 hours have been reviewed.    Significant Imaging: I have reviewed all pertinent imaging results/findings within the past 24 hours.      Assessment & Plan  Pseudoaneurysm of femoral artery following procedure  74-year-old male transferred from [facility] with a left common femoral artery pseudoaneurysm. He underwent LHC, abdominal angioram, and PTA-S of R MOUNIKA on 4/28. This was complicated by a groin access complication on the right that was treated in the right CFA from left groin access. His currrent groin access complication on the left.    Seen by Vascular Surgery who plans to inject thrombin directly into vessel at bedside with a post-procedural ultrasound.    -Hold anticoagulation  -Daily PT/INR  -Trend CBC  Essential (primary) hypertension  Patient's blood pressure range in the last 24 hours was: BP  Min: 119/58  Max: 159/67.The patient's inpatient anti-hypertensive regimen is listed below:  Current Antihypertensives  metoprolol succinate (TOPROL-XL) 24 hr tablet 25 mg, Daily, Oral    Plan  - BP is controlled, no changes needed to their regimen  -  Normotensive on admit, antihypertensives held  Anticoagulated on Coumadin  On coumadin.    -hold coumadin due to pseudoaneurysm  -daily PT/INR  Mixed hypercholesterolemia and hypertriglyceridemia  Stable    -continue statin  PAF (paroxysmal atrial fibrillation)  Patient has paroxysmal (<7 days) atrial fibrillation. Patient is currently in sinus rhythm. [id number] Score: 3. The patients heart rate in the last 24 hours is as follows:  Pulse  Min: 59  Max: 79     Antiarrhythmics  amiodarone tablet 200 mg, Daily, Oral  metoprolol succinate (TOPROL-XL) 24 hr tablet 25 mg, Daily, Oral    Anticoagulants       Plan  - Replete lytes with a goal of K>4, Mg >2  - Patient is anticoagulated, see medications listed above.  - Patient's afib is currently controlled  - Continue amiodarone  VTE Risk Mitigation (From admission, onward)           Ordered     IP VTE HIGH RISK PATIENT  Once         05/03/25 0203     Place sequential compression device  Until discontinued         05/03/25 0203     Reason for No Pharmacological VTE Prophylaxis  Once        Question:  Reasons:  Answer:  Risk of Bleeding    05/03/25 0203     Place sequential compression device  Until discontinued         05/02/25 7521                    Discharge Planning   BENTLEY:      Code Status: Full Code   Medical Readiness for Discharge Date:            Cain Sheridan MD  Department of Hospital Medicine   [facility] - Cardiology Stepdown

## 2025-05-04 NOTE — ASSESSMENT & PLAN NOTE
Patient's blood pressure range in the last 24 hours was: BP  Min: 119/58  Max: 159/67.The patient's inpatient anti-hypertensive regimen is listed below:  Current Antihypertensives  metoprolol succinate (TOPROL-XL) 24 hr tablet 25 mg, Daily, Oral    Plan  - BP is controlled, no changes needed to their regimen  - Normotensive on admit, antihypertensives held

## 2025-05-04 NOTE — PROGRESS NOTES
[facility] - Cardiology Stepdown  Vascular Surgery  Progress Note    Patient Name: Frederick Bustos  MRN: [id number]  Admission Date: 5/2/2025  Primary Care Provider: Matt Bell MD    Subjective:     Interval History: No acute events overnight. C/o back pain/muscle spasms. No LLE motorsensory deficits.     Post-Op Info:  * No surgery found *       [Prescriptions Prior to Admission]    [Prescriptions Prior to Admission]  Medications Prior to Admission   Medication Sig Dispense Refill Last Dose/Taking    acetaminophen (TYLENOL) 500 mg Cap Take 1,000-1,500 mg by mouth 3 (three) times daily as needed.        amiodarone (PACERONE) 200 MG Tab Take 1 tablet (200 mg total) by mouth once daily. 90 tablet 2     amLODIPine (NORVASC) 5 MG tablet TAKE 1 TABLET EVERY DAY (Patient taking differently: Take 5 mg by mouth once daily.) 90 tablet 3     atorvastatin (LIPITOR) 80 MG tablet TAKE 1 TABLET EVERY DAY (Patient taking differently: Take 80 mg by mouth once daily.) 90 tablet 3     clopidogreL (PLAVIX) 75 mg tablet TAKE 1 TABLET ONE TIME DAILY (Patient taking differently: Take 75 mg by mouth nightly.) 90 tablet 3     enoxaparin (LOVENOX) 100 mg/mL Syrg Inject 1 mL (100 mg total) into the skin every 12 (twelve) hours. #10= 5 day supply 10 each 1     ezetimibe (ZETIA) 10 mg tablet TAKE 1 TABLET EVERY DAY (Patient taking differently: Take 10 mg by mouth once daily.) 90 tablet 3     fenofibrate 160 MG Tab TAKE 1 TABLET ONE TIME DAILY (Patient taking differently: Take 160 mg by mouth once daily.) 90 tablet 2     HYDROcodone-acetaminophen (NORCO) 5-325 mg per tablet Take 1 tablet by mouth every 8 (eight) hours as needed for Pain. 10 tablet 0     levothyroxine (SYNTHROID) 75 MCG tablet Take 1 tablet (75 mcg total) by mouth before breakfast. 90 tablet 2     lisinopriL (PRINIVIL,ZESTRIL) 40 MG tablet TAKE 1 TABLET EVERY DAY (Patient taking differently: Take 40 mg by mouth once daily.) 90 tablet 3     metoprolol succinate (TOPROL-XL) 25  MG 24 hr tablet TAKE 1 TABLET EVERY DAY (Patient taking differently: Take 25 mg by mouth once daily.) 90 tablet 3     multivitamin capsule Take 1 capsule by mouth once daily.       mv-mn/iron/folic acid/herb 190 (VITAMIN D3 COMPLETE ORAL) Take 1 capsule by mouth nightly.       TURMERIC ORAL Take 1 capsule by mouth Daily.       warfarin (COUMADIN) 5 MG tablet TAKE AS DIRECTED (NEEDS APPOINTMENT BEFORE ANY FURTHER REFILLS) (Patient taking differently: Take 5 mg by mouth nightly. 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    Cardiac pacemaker 06/15/2016    PPM Gen change Eluna 8 DRT (sn [id number])    CHF (congestive heart failure) 12/03/2019    Class 2-3     Combined fat and carbohydrate induced hyperlipemia 1/19/2006    Coronary artery disease     stents 2;     Decreased cardiac ejection fraction 11/2019    Encounter for blood transfusion     Essential (primary) hypertension 8/30/2007    H/O: GI bleed     History of mitral valve replacement with mechanical valve 2008    Ischemic cardiomyopathy 11/2019    PAF (paroxysmal atrial fibrillation)     SSS (sick sinus syndrome)     Thyroid disease      Past Surgical History:   Procedure Laterality Date    ANGIOGRAM, ABDOMINAL AORTA  4/28/2025    Procedure: Abdominal angiogram;  Surgeon: Dung Copeland MD;  Location: [facility] [place] CATH;  Service: Cardiology;;    ANGIOGRAM, CORONARY, WITH LEFT HEART CATHETERIZATION  4/28/2025    Procedure: Left Heart Cath;  Surgeon: Dung Copeland MD;  Location: [facility] [place] CATH;  Service: Cardiology;;    CARDIAC PACEMAKER PLACEMENT      CARDIAC SURGERY      CARDIAC VALVE REPLACEMENT  03/20/2008    COLONOSCOPY N/A 09/06/2023    Procedure: COLONOSCOPY;  Surgeon: Dung Ruiz MD;  Location: [facility] [place] ENDO;  Service: Endoscopy;   Laterality: N/A;    CORONARY ANGIOGRAPHY N/A 2019    Procedure: ANGIOGRAM, CORONARY ARTERY;  Surgeon: Sanam Gibbs MD;  Location: [facility] CATH;  Service: Cardiology;  Laterality: N/A;    CORONARY ANGIOGRAPHY N/A 2019    Procedure: ANGIOGRAM, CORONARY ARTERY;  Surgeon: Sanam Gibbs MD;  Location: [facility] CATH;  Service: Cardiology;  Laterality: N/A;    CORONARY ARTERY BYPASS GRAFT  2008    and valve replacement    CORONARY STENT PLACEMENT Left 2019    Procedure: INSERTION, STENT, CORONARY ARTERY;  Surgeon: Sanam Gibbs MD;  Location: [facility] CATH;  Service: Cardiology;  Laterality: Left;    EYE SURGERY  2021    lens implant,J&J, right eye    INSERTION OF BIVENTRICULAR IMPLANTABLE CARDIOVERTER-DEFIBRILLATOR (ICD) N/A 2020    Procedure: INSERTION, ICD, BIVENTRICULAR- BIOTRONIK;  Surgeon: Sanam Gibbs MD;  Location: [facility] CATH;  Service: Cardiology;  Laterality: N/A;    INSERTION OF PACEMAKER  06/15/2016    INSERTION, STENT, BARE METAL, ARTERY, PERIPHERAL  2025    Procedure: PTA / Stent Rt. Common Iliac;  Surgeon: Dung Copeland MD;  Location: [facility] CATH;  Service: Cardiology;;    LEFT HEART CATHETERIZATION Left 2019    Procedure: Left heart cath;  Surgeon: Sanam Gibbs MD;  Location: [facility] CATH;  Service: Cardiology;  Laterality: Left;    LIGATE GI BLEEDER      MITRAL VALVE REPLACEMENT  2008    with Cabg X 3    WRIST SURGERY Right      Family History       Problem Relation (Age of Onset)    Heart disease Father    No Known Problems Mother          Tobacco Use    Smoking status: Former     Current packs/day: 0.00     Average packs/day: 1.5 packs/day for 40.0 years (60.0 ttl pk-yrs)     Types: Cigarettes     Start date: 1968     Quit date: 2008     Years since quittin.1    Smokeless tobacco: Never   Substance and Sexual Activity    Alcohol use: Yes     Alcohol/week: 4.0 standard drinks of alcohol     Types: 4 Glasses of wine per week     Comment: daily    Drug  use: No    Sexual activity: Never     Review of Systems   All other systems reviewed and are negative.    Objective:     Vital Signs (Most Recent):  Temp: 98 °F (36.7 °C) (05/04/25 0734)  Pulse: 60 (05/04/25 0753)  Resp: 18 (05/04/25 0734)  BP: 132/62 (05/04/25 0734)  SpO2: 95 % (05/04/25 0734) Vital Signs (24h Range):  Temp:  [97.9 °F (36.6 °C)-98.3 °F (36.8 °C)] 98 °F (36.7 °C)  Pulse:  [60-79] 60  Resp:  [16-18] 18  SpO2:  [92 %-95 %] 95 %  BP: (113-139)/(56-74) 132/62     Weight: 97.1 kg (214 lb 1.1 oz)  Body mass index is 29.03 kg/m².      Physical Exam  Constitutional:       General: He is not in acute distress.  HENT:      Mouth/Throat:      Mouth: Mucous membranes are moist.   Cardiovascular:      Rate and Rhythm: Normal rate.      Comments:   Palpable femoral pulses, large L groin mass w/ significant ecchymosis  Palpable pedal pulses, 2+ B/L  No motor sensory deficits  Pulmonary:      Effort: Pulmonary effort is normal.   Neurological:      Mental Status: He is alert.      Sensory: No sensory deficit.      Motor: No weakness.          Significant Labs:  All pertinent labs from the last 24 hours have been reviewed.    Significant Diagnostics:  I have reviewed all pertinent imaging results/findings within the past 24 hours.    Assessment/Plan:     * Pseudoaneurysm of femoral artery following procedure  L CFA PSA, post procedure at outside hospital.     Bedside thrombin injection today, noon. Informed consent obtained.   Needs to remain NPO.     Please continue to hold coumadin.       Angel Pate MD  Vascular Surgery Fellow  [facility] - Cardiology Stepdown

## 2025-05-04 NOTE — ASSESSMENT & PLAN NOTE
Patient has paroxysmal (<7 days) atrial fibrillation. Patient is currently in sinus rhythm. [id number] Score: 3. The patients heart rate in the last 24 hours is as follows:  Pulse  Min: 59  Max: 79     Antiarrhythmics  amiodarone tablet 200 mg, Daily, Oral  metoprolol succinate (TOPROL-XL) 24 hr tablet 25 mg, Daily, Oral    Anticoagulants       Plan  - Replete lytes with a goal of K>4, Mg >2  - Patient is anticoagulated, see medications listed above.  - Patient's afib is currently controlled  - Continue amiodarone

## 2025-05-05 VITALS
DIASTOLIC BLOOD PRESSURE: 59 MMHG | BODY MASS INDEX: 28.99 KG/M2 | SYSTOLIC BLOOD PRESSURE: 110 MMHG | OXYGEN SATURATION: 96 % | RESPIRATION RATE: 18 BRPM | HEART RATE: 60 BPM | TEMPERATURE: 98 F | HEIGHT: 72 IN | WEIGHT: 214.06 LBS

## 2025-05-05 PROBLEM — I72.4 PSEUDOANEURYSM OF FEMORAL ARTERY: Status: ACTIVE | Noted: 2025-05-05

## 2025-05-05 LAB
ABSOLUTE EOSINOPHIL (OHS): 0.32 K/UL
ABSOLUTE MONOCYTE (OHS): 0.68 K/UL (ref 0.3–1)
ABSOLUTE NEUTROPHIL COUNT (OHS): 6.91 K/UL (ref 1.8–7.7)
ANION GAP (OHS): 6 MMOL/L (ref 8–16)
BASOPHILS # BLD AUTO: 0.06 K/UL
BASOPHILS NFR BLD AUTO: 0.7 %
BUN SERPL-MCNC: 20 MG/DL (ref 8–23)
CALCIUM SERPL-MCNC: 8.8 MG/DL (ref 8.7–10.5)
CHLORIDE SERPL-SCNC: 108 MMOL/L (ref 95–110)
CO2 SERPL-SCNC: 26 MMOL/L (ref 23–29)
CREAT SERPL-MCNC: 1 MG/DL (ref 0.5–1.4)
ERYTHROCYTE [DISTWIDTH] IN BLOOD BY AUTOMATED COUNT: 15.2 % (ref 11.5–14.5)
GFR SERPLBLD CREATININE-BSD FMLA CKD-EPI: >60 ML/MIN/1.73/M2
GLUCOSE SERPL-MCNC: 101 MG/DL (ref 70–110)
HCT VFR BLD AUTO: 33.9 % (ref 40–54)
HGB BLD-MCNC: 10.7 GM/DL (ref 14–18)
IMM GRANULOCYTES # BLD AUTO: 0.07 K/UL (ref 0–0.04)
IMM GRANULOCYTES NFR BLD AUTO: 0.8 % (ref 0–0.5)
INR PPP: 1.6 (ref 0.8–1.2)
LYMPHOCYTES # BLD AUTO: 0.45 K/UL (ref 1–4.8)
MCH RBC QN AUTO: 30.6 PG (ref 27–31)
MCHC RBC AUTO-ENTMCNC: 31.6 G/DL (ref 32–36)
MCV RBC AUTO: 97 FL (ref 82–98)
NUCLEATED RBC (/100WBC) (OHS): 0 /100 WBC
PLATELET # BLD AUTO: 235 K/UL (ref 150–450)
PMV BLD AUTO: 11.1 FL (ref 9.2–12.9)
POTASSIUM SERPL-SCNC: 3.7 MMOL/L (ref 3.5–5.1)
PROTHROMBIN TIME: 17.2 SECONDS (ref 9–12.5)
RBC # BLD AUTO: 3.5 M/UL (ref 4.6–6.2)
RELATIVE EOSINOPHIL (OHS): 3.8 %
RELATIVE LYMPHOCYTE (OHS): 5.3 % (ref 18–48)
RELATIVE MONOCYTE (OHS): 8 % (ref 4–15)
RELATIVE NEUTROPHIL (OHS): 81.4 % (ref 38–73)
SODIUM SERPL-SCNC: 140 MMOL/L (ref 136–145)
WBC # BLD AUTO: 8.49 K/UL (ref 3.9–12.7)

## 2025-05-05 PROCEDURE — 25000003 PHARM REV CODE 250: Performed by: STUDENT IN AN ORGANIZED HEALTH CARE EDUCATION/TRAINING PROGRAM

## 2025-05-05 PROCEDURE — 85025 COMPLETE CBC W/AUTO DIFF WBC: CPT

## 2025-05-05 PROCEDURE — 93926 LOWER EXTREMITY STUDY: CPT | Mod: 52 | Performed by: SURGERY

## 2025-05-05 PROCEDURE — 85610 PROTHROMBIN TIME: CPT

## 2025-05-05 PROCEDURE — 36415 COLL VENOUS BLD VENIPUNCTURE: CPT

## 2025-05-05 PROCEDURE — 25000003 PHARM REV CODE 250

## 2025-05-05 PROCEDURE — 99233 SBSQ HOSP IP/OBS HIGH 50: CPT | Mod: ,,, | Performed by: SURGERY

## 2025-05-05 PROCEDURE — 80048 BASIC METABOLIC PNL TOTAL CA: CPT

## 2025-05-05 RX ORDER — WARFARIN SODIUM 5 MG/1
TABLET ORAL
Qty: 120 TABLET | Refills: 2 | Status: SHIPPED | OUTPATIENT
Start: 2025-05-06

## 2025-05-05 RX ORDER — CLOPIDOGREL BISULFATE 75 MG/1
75 TABLET ORAL DAILY
Qty: 90 TABLET | Refills: 3 | Status: SHIPPED | OUTPATIENT
Start: 2025-05-06

## 2025-05-05 RX ADMIN — LEVOTHYROXINE SODIUM 75 MCG: 75 TABLET ORAL at 06:05

## 2025-05-05 RX ADMIN — AMIODARONE HYDROCHLORIDE 200 MG: 200 TABLET ORAL at 08:05

## 2025-05-05 RX ADMIN — ATORVASTATIN CALCIUM 80 MG: 40 TABLET, FILM COATED ORAL at 08:05

## 2025-05-05 RX ADMIN — METHOCARBAMOL 500 MG: 500 TABLET ORAL at 12:05

## 2025-05-05 RX ADMIN — ACETAMINOPHEN 650 MG: 325 TABLET ORAL at 12:05

## 2025-05-05 RX ADMIN — METHOCARBAMOL 500 MG: 500 TABLET ORAL at 08:05

## 2025-05-05 RX ADMIN — METOPROLOL SUCCINATE 25 MG: 25 TABLET, EXTENDED RELEASE ORAL at 08:05

## 2025-05-05 RX ADMIN — ACETAMINOPHEN 650 MG: 325 TABLET ORAL at 06:05

## 2025-05-05 NOTE — PLAN OF CARE
Initial Discharge Planning Assessment:  Patient admitted on: 5/4/25     Chart reviewed, Care plan discussed with treatment team,  attending Dr. Sheridan     PCP updated in Epic: Matt Faustin     Pharmacy, updated in Epic: bedside      DME at home: None       Current dispo: Home with family       Transportation: Family can provide      Power of  or Living Will: Wife      Anticipated DC needs from CM perspective:      Discharge Plan A and Plan B have been determined by review of patient's clinical status, future medical and therapeutic needs, and coverage/benefits for post-acute care in coordination with multidisciplinary team members.          [facility] - [facility] Step[facility]  Initial Discharge Assessment       Primary Care Provider: Matt Bell MD    Admission Diagnosis: Pseudoaneurysm [I72.9]  Pseudoaneurysm of femoral artery [I72.4]    Admission Date: 5/2/2025  Expected Discharge Date: 5/5/2025    Transition of Care Barriers: (P) None    Payor: [employer] MANAGED MEDICARE / Plan: [employer] MEDICARE HMO / Product Type: Capitation /     Extended Emergency Contact Information  Primary Emergency Contact: Aleja Bustos  Address: [address] [place] [address] [address]  Mobile Phone: [phone]  Relation: Spouse    Discharge Plan A: (P) Home with family  Discharge Plan B: (P) Home with family, [facility] - [facility] [address] [facility]  [address] [facility] [address]  Phone: [phone] Fax: [phone]    [id number] [place] [address] AT [facility] [address]  [facility] [address]  Phone: [phone] Fax: [phone]      Initial Assessment (most recent)       Adult Discharge Assessment - 05/05/25 1438          Discharge Assessment    Assessment Type Discharge Planning Assessment     Confirmed/corrected address, phone number and insurance Yes      Confirmed Demographics Correct on Facesheet     Source of Information patient;family;health record     People in Home spouse     Do you expect to return to your current living situation? Yes     Do you have help at home or someone to help you manage your care at home? Yes     Who are your caregiver(s) and their phone number(s)? family     Prior to hospitilization cognitive status: Alert/Oriented     Current cognitive status: Alert/Oriented     Walking or Climbing Stairs Difficulty no     Dressing/Bathing Difficulty no     Equipment Currently Used at Home none     Readmission within 30 days? Yes (P)    4/28/25    Patient currently being followed by outpatient case management? No (P)      Do you currently have service(s) that help you manage your care at home? No (P)      Do you take prescription medications? Yes (P)      Do you have any problems affording any of your prescribed medications? No (P)      Is the patient taking medications as prescribed? yes (P)      How do you get to doctors appointments? car, drives self;family or friend will provide (P)      Are you on dialysis? No (P)      Do you take coumadin? No (P)      Discharge Plan A Home with family (P)      Discharge Plan B Home with family;Home Health (P)      DME Needed Upon Discharge  none (P)      Discharge Plan discussed with: Patient;Spouse/sig other (P)      Transition of Care Barriers None (P)

## 2025-05-05 NOTE — PLAN OF CARE
Pt discharged per MD orders.  Tele discontinued and returned to station.  IV discontinued; catheter tip intact.  Medication list and prescriptions reviewed.  Pt verbalizes understanding of all written and verbal discharge instructions.  Pt awaiting family/escort arrival.  Will continue to monitor.     Problem: Adult Inpatient Plan of Care  Goal: Plan of Care Review  Outcome: Met  Goal: Patient-Specific Goal (Individualized)  Outcome: Met  Goal: Absence of Hospital-Acquired Illness or Injury  Outcome: Met  Goal: Optimal Comfort and Wellbeing  Outcome: Met  Goal: Readiness for Transition of Care  Outcome: Met     Problem: Wound  Goal: Optimal Coping  Outcome: Met  Goal: Optimal Functional Ability  Outcome: Met  Goal: Absence of Infection Signs and Symptoms  Outcome: Met  Goal: Improved Oral Intake  Outcome: Met  Goal: Optimal Pain Control and Function  Outcome: Met  Goal: Skin Health and Integrity  Outcome: Met  Goal: Optimal Wound Healing  Outcome: Met     Problem: Fall Injury Risk  Goal: Absence of Fall and Fall-Related Injury  Outcome: Met     Problem: Infection  Goal: Absence of Infection Signs and Symptoms  Outcome: Met

## 2025-05-05 NOTE — PLAN OF CARE
05/05/25 1437   Medicare Message   Important Message from Medicare regarding Discharge Appeal Rights Given to patient/caregiver;Explained to patient/caregiver;Signed/date by patient/caregiver   Date IMM was signed 05/05/25   Time IMM was signed 0900

## 2025-05-05 NOTE — PLAN OF CARE
[facility] - [facility]iology Stepdown  Discharge Final Note    Primary Care Provider: Matt Bell MD    Expected Discharge Date: 5/5/2025    Patient discharged to home via family personal transportation.     Patient's bedside nurse and family  notified of the above.    Discharge Plan A and Plan B have been determined by review of patient's clinical status, future medical and therapeutic needs, and coverage/benefits for post-acute care in coordination with multidisciplinary team members.        Final Discharge Note (most recent)       Final Note - 05/05/25 1442          Final Note    Assessment Type Final Discharge Note (P)      Anticipated Discharge Disposition Home or Self Care (P)      Hospital Resources/Appts/Education Provided Provided patient/caregiver with written discharge plan information;Appointments scheduled and added to AVS (P)         Post-Acute Status    Discharge Delays None known at this time (P)                      Important Message from Medicare  Important Message from Medicare regarding Discharge Appeal Rights: Given to patient/caregiver, Explained to patient/caregiver, Signed/date by patient/caregiver     Date IMM was signed: 05/05/25  Time IMM was signed: 0900        Future Appointments   Date Time Provider Department Center   5/8/2025 12:45 PM Kristie Zimmer NP [facility]   6/30/2025 10:00 AM Matt Bell MD [facility] MD [facility] AKT[facility] scheduled post-discharge follow-up appointment and information added to AVS.

## 2025-05-05 NOTE — SUBJECTIVE & OBJECTIVE
Medications:  Continuous Infusions:  Scheduled Meds:   acetaminophen  650 mg Oral Q6H    amiodarone  200 mg Oral Daily    atorvastatin  80 mg Oral Daily    levothyroxine  75 mcg Oral Before breakfast    LIDOcaine HCL 10 mg/ml (1%)  10 mL Intradermal Once    methocarbamoL  500 mg Oral QID    metoprolol succinate  25 mg Oral Daily     PRN Meds:  Current Facility-Administered Medications:     0.9%  NaCl infusion (for blood administration), , Intravenous, Q24H PRN    dextrose 50%, 12.5 g, Intravenous, PRN    dextrose 50%, 25 g, Intravenous, PRN    glucagon (human recombinant), 1 mg, Intramuscular, PRN    glucose, 16 g, Oral, PRN    glucose, 24 g, Oral, PRN    melatonin, 6 mg, Oral, Nightly PRN    naloxone, 0.02 mg, Intravenous, PRN    oxyCODONE, 5 mg, Oral, Q6H PRN    simethicone, 1 tablet, Oral, TID PRN    sodium chloride 0.9%, 10 mL, Intravenous, PRN    sodium chloride 0.9%, 10 mL, Intravenous, Q12H PRN    thrombin (bovine) in 0.9% NaCl 5 mL, , Other, On Call Procedure     Objective:     Vital Signs (Most Recent):  Temp: 98.7 °F (37.1 °C) (05/05/25 0320)  Pulse: 60 (05/05/25 0346)  Resp: 18 (05/05/25 0320)  BP: (!) 145/81 (05/05/25 0320)  SpO2: 95 % (05/05/25 0320) Vital Signs (24h Range):  Temp:  [98 °F (36.7 °C)-98.8 °F (37.1 °C)] 98.7 °F (37.1 °C)  Pulse:  [59-61] 60  Resp:  [18] 18  SpO2:  [93 %-95 %] 95 %  BP: (132-159)/(62-81) 145/81          Physical Exam  Constitutional:       General: He is not in acute distress.  HENT:      Mouth/Throat:      Mouth: Mucous membranes are moist.   Cardiovascular:      Rate and Rhythm: Normal rate.      Comments: Palpable femoral pulses, large L groin mass w/ significant ecchymosis.  Dressing on L groin site removed. C/d/i  Palpable pedal pulses, 2+ B/L  No motor sensory deficits  Pulmonary:      Effort: Pulmonary effort is normal.   Neurological:      Mental Status: He is alert.      Sensory: No sensory deficit.      Motor: No weakness.          Significant Labs:  CBC:    Recent Labs   Lab 05/04/25  0822   WBC 7.08   RBC 3.42*   HGB 10.3*   HCT 32.7*      MCV 96   MCH 30.1   MCHC 31.5*     CMP:   Recent Labs   Lab 05/04/25  0822   GLU 91   CALCIUM 8.7      K 3.7   CO2 26      BUN 18   CREATININE 0.9       Significant Diagnostics:  I have reviewed all pertinent imaging results/findings within the past 24 hours.

## 2025-05-05 NOTE — ASSESSMENT & PLAN NOTE
L CFA PSA, post procedure at outside hospital.     POD 1 s/p bedside thrombin injection  U/S today to confirm resolution   Ok to restart coumadin pending U/S results  INR goals in setting of mechanical valve 2.5-3.5  Daily INR, bed rest - does not need to lie flat  Remainder of care per primary team

## 2025-05-05 NOTE — PROGRESS NOTES
[facility] - Cardiology Stepdown  Vascular Surgery  Progress Note    Patient Name: Frederick Bustos  MRN: [id number]  Admission Date: 5/2/2025  Primary Care Provider: Matt Bell MD    Subjective:     Interval History: POD 1 s/p bedside thrombin injection. DEANNA. Doing well this AM. Denies pain in L groin.     Post-Op Info:  * No surgery found *         Medications:  Continuous Infusions:  Scheduled Meds:   acetaminophen  650 mg Oral Q6H    amiodarone  200 mg Oral Daily    atorvastatin  80 mg Oral Daily    levothyroxine  75 mcg Oral Before breakfast    LIDOcaine HCL 10 mg/ml (1%)  10 mL Intradermal Once    methocarbamoL  500 mg Oral QID    metoprolol succinate  25 mg Oral Daily     PRN Meds:  Current Facility-Administered Medications:     0.9%  NaCl infusion (for blood administration), , Intravenous, Q24H PRN    dextrose 50%, 12.5 g, Intravenous, PRN    dextrose 50%, 25 g, Intravenous, PRN    glucagon (human recombinant), 1 mg, Intramuscular, PRN    glucose, 16 g, Oral, PRN    glucose, 24 g, Oral, PRN    melatonin, 6 mg, Oral, Nightly PRN    naloxone, 0.02 mg, Intravenous, PRN    oxyCODONE, 5 mg, Oral, Q6H PRN    simethicone, 1 tablet, Oral, TID PRN    sodium chloride 0.9%, 10 mL, Intravenous, PRN    sodium chloride 0.9%, 10 mL, Intravenous, Q12H PRN    thrombin (bovine) in 0.9% NaCl 5 mL, , Other, On Call Procedure     Objective:     Vital Signs (Most Recent):  Temp: 98.7 °F (37.1 °C) (05/05/25 0320)  Pulse: 60 (05/05/25 0346)  Resp: 18 (05/05/25 0320)  BP: (!) 145/81 (05/05/25 0320)  SpO2: 95 % (05/05/25 0320) Vital Signs (24h Range):  Temp:  [98 °F (36.7 °C)-98.8 °F (37.1 °C)] 98.7 °F (37.1 °C)  Pulse:  [59-61] 60  Resp:  [18] 18  SpO2:  [93 %-95 %] 95 %  BP: (132-159)/(62-81) 145/81          Physical Exam  Constitutional:       General: He is not in acute distress.  HENT:      Mouth/Throat:      Mouth: Mucous membranes are moist.   Cardiovascular:      Rate and Rhythm: Normal rate.      Comments: Palpable  femoral pulses, large L groin mass w/ significant ecchymosis.  Dressing on L groin site removed. C/d/i  Palpable pedal pulses, 2+ B/L  No motor sensory deficits  Pulmonary:      Effort: Pulmonary effort is normal.   Neurological:      Mental Status: He is alert.      Sensory: No sensory deficit.      Motor: No weakness.          Significant Labs:  CBC:   Recent Labs   Lab 05/04/25  0822   WBC 7.08   RBC 3.42*   HGB 10.3*   HCT 32.7*      MCV 96   MCH 30.1   MCHC 31.5*     CMP:   Recent Labs   Lab 05/04/25  0822   GLU 91   CALCIUM 8.7      K 3.7   CO2 26      BUN 18   CREATININE 0.9       Significant Diagnostics:  I have reviewed all pertinent imaging results/findings within the past 24 hours.  Assessment/Plan:     * Pseudoaneurysm of femoral artery following procedure  L CFA PSA, post procedure at outside hospital.     POD 1 s/p bedside thrombin injection  U/S today to confirm resolution of pseudoaneurysm flow   Ok to d/c today and restart coumadin tomorrow 5/6 pending U/S results   Will need follow up with cardiologist in 1 week with U/S  INR goals in setting of mechanical valve 2.5-3.5  Daily INR, bed rest - does not need to lie flat  Remainder of care per primary team    Jessica Tee MD  Vascular Surgery  [facility] - Cardiology Stepdown

## 2025-05-06 NOTE — DISCHARGE SUMMARY
[facility] - Cardiology [facility] Medicine  Discharge Summary      Patient Name: Frederick Bustos  MRN: [id number]  CONNOR: [id number]  Patient Class: IP- Inpatient  Admission Date: 5/2/2025  Hospital Length of Stay: 1 days  Discharge Date and Time: 5/5/25  Attending Physician: No att. providers found   Discharging Provider: Cain Sheridan MD  Primary Care Provider: Matt Bell MD  [facility] Medicine Team: [facility] HOSP MED B Cain Sheridan MD  Primary Care Team: [facility]    HPI:   Frederick Bustos is a 74-year-old male with hypertension, hyperlipidemia, mechanical mitral valve on Coumadin, history of CABG x3 in 2008, coronary stent in 2019, history of laryngeal cancer status post adjuvant radiation, pacemaker, CHF, aortic stenosis, AFib who was transferred from [facility] with a left common femoral artery pseudoaneurysm. He underwent LHC, abdominal angioram, and PTA-S of R MOUNIKA on 4/28. This was complicated by a groin access complication on the right that was treated in the right CFA from left groin access. His currrent groin access complication on the left. Denies fever, chills, nausea, emesis, abdominal pain, leg swelling, weakness, and sensory changes. He was seen by Vascular Surgery who plans to inject thrombin directly into vessel at bedside with a post-procedural ultrasound. Anticipate rapid discharge after this. ED provider spoke to Cardiology and noted that because he has a mechanical heart valve they recommend just holding the Coumadin and rechecking tomorrow morning.    The patient was admitted to Hospital Medicine for further workup and management.      Hospital Course:   Assessment & Plan  Pseudoaneurysm of femoral artery following procedure  74-year-old male transferred [facility] with a left common femoral artery pseudoaneurysm. He underwent LHC, abdominal angioram, and PTA-S of R MOUNIKA on 4/28. This was complicated by a groin access complication on the right that was treated in the right CFA  from left groin access. His currrent groin access complication on the left.    Seen by Vascular Surgery who plans to inject thrombin directly into vessel at bedside with a post-procedural ultrasound.  -Hold anticoagulation  -Daily PT/INR  -Trend CBC  -thrombin injection completed. Post-procedural ultrasound with improvement  -Vascular surgery rec hold AC for additional day and discussed with patient. Patient deemed ready for discharge. Patient seen and examined day of discharge. Plan discussed with pt and family, who were agreeable and amenable; medications were discussed and reviewed, outpatient follow-up arranged, ER precautions were given, all questions were answered to their satisfaction, and Frederick Bustos  was subsequently discharged.    Final Active Diagnoses:    Diagnosis Date Noted POA    PRINCIPAL PROBLEM:  Pseudoaneurysm of femoral artery following procedure [T81.718A, I72.4] 05/02/2025 Yes    Pseudoaneurysm of femoral artery [I72.4] 05/05/2025 Unknown    PAF (paroxysmal atrial fibrillation) [I48.0]  Yes    Mixed hypercholesterolemia and hypertriglyceridemia [E78.2] 11/29/2016 Yes    Anticoagulated on Coumadin [Z79.01] 11/11/2015 Not Applicable    Essential (primary) hypertension [I10] 08/30/2007 Yes      Problems Resolved During this Admission:       Discharged Condition: good    Disposition: Home or Self Care    Follow Up:    Patient Instructions:      Notify your health care provider if you experience any of the following:  temperature >100.4     Notify your health care provider if you experience any of the following:  persistent nausea and vomiting or diarrhea     Notify your health care provider if you experience any of the following:  severe uncontrolled pain     Notify your health care provider if you experience any of the following:  redness, tenderness, or signs of infection (pain, swelling, redness, odor or green/yellow discharge around incision site)     Notify your health care provider if you  experience any of the following:  difficulty breathing or increased cough     Notify your health care provider if you experience any of the following:  severe persistent headache     Notify your health care provider if you experience any of the following:  worsening rash     Notify your health care provider if you experience any of the following:  persistent dizziness, light-headedness, or visual disturbances     Activity as tolerated       Significant Diagnostic Studies: N/A    Pending Diagnostic Studies:       None           Medications:  Reconciled Home Medications:      Medication List        CHANGE how you take these medications      clopidogreL 75 mg tablet  Commonly known as: PLAVIX  Take 1 tablet (75 mg total) by mouth once daily. Resume 5/6  What changed:   when to take this  additional instructions     warfarin 5 MG tablet  Commonly known as: COUMADIN  Resume 5/6  What changed: additional instructions            CONTINUE taking these medications      acetaminophen 500 mg Cap  Commonly known as: TYLENOL  Take 1,000-1,500 mg by mouth 3 (three) times daily as needed.     amiodarone 200 MG Tab  Commonly known as: PACERONE  Take 1 tablet (200 mg total) by mouth once daily.     amLODIPine 5 MG tablet  Commonly known as: NORVASC  TAKE 1 TABLET EVERY DAY     atorvastatin 80 MG tablet  Commonly known as: LIPITOR  TAKE 1 TABLET EVERY DAY     enoxaparin 100 mg/mL Syrg  Commonly known as: LOVENOX  Inject 1 mL (100 mg total) into the skin every 12 (twelve) hours. #10= 5 day supply     ezetimibe 10 mg tablet  Commonly known as: ZETIA  TAKE 1 TABLET EVERY DAY     fenofibrate 160 MG Tab  TAKE 1 TABLET ONE TIME DAILY     HYDROcodone-acetaminophen 5-325 mg per tablet  Commonly known as: NORCO  Take 1 tablet by mouth every 8 (eight) hours as needed for Pain.     levothyroxine 75 MCG tablet  Commonly known as: SYNTHROID  Take 1 tablet (75 mcg total) by mouth before breakfast.     lisinopriL 40 MG tablet  Commonly known as:  PRINIVIL,ZESTRIL  TAKE 1 TABLET EVERY DAY     metoprolol succinate 25 MG 24 hr tablet  Commonly known as: TOPROL-XL  TAKE 1 TABLET EVERY DAY     multivitamin capsule  Take 1 capsule by mouth once daily.     TURMERIC ORAL  Take 1 capsule by mouth Daily.     VITAMIN D3 COMPLETE ORAL  Take 1 capsule by mouth nightly.              Indwelling Lines/Drains at time of discharge:   Lines/Drains/Airways       None                   Time spent on the discharge of patient: 35 minutes         Cain Sheridan MD  Department of Hospital Medicine  [facility] - Cardiology Stepdown

## 2025-05-06 NOTE — ASSESSMENT & PLAN NOTE
74-year-old male transferred from [facility] with a left common femoral artery pseudoaneurysm. He underwent LHC, abdominal angioram, and PTA-S of R MOUNIKA on 4/28. This was complicated by a groin access complication on the right that was treated in the right CFA from left groin access. His currrent groin access complication on the left.    Seen by Vascular Surgery who plans to inject thrombin directly into vessel at bedside with a post-procedural ultrasound.  -Hold anticoagulation  -Daily PT/INR  -Trend CBC  -thrombin injection completed. Post-procedural ultrasound with improvement  -Vascular surgery rec hold AC for additional day and discussed with patient. Patient deemed ready for discharge. Patient seen and examined day of discharge. Plan discussed with pt and family, who were agreeable and amenable; medications were discussed and reviewed, outpatient follow-up arranged, ER precautions were given, all questions were answered to their satisfaction, and Frederick Bustos  was subsequently discharged.

## 2025-05-07 ENCOUNTER — PATIENT OUTREACH (OUTPATIENT)
Dept: ADMINISTRATIVE | Facility: CLINIC | Age: 75
End: 2025-05-07
Payer: MEDICARE